# Patient Record
Sex: FEMALE | Race: WHITE | NOT HISPANIC OR LATINO | Employment: FULL TIME | URBAN - NONMETROPOLITAN AREA
[De-identification: names, ages, dates, MRNs, and addresses within clinical notes are randomized per-mention and may not be internally consistent; named-entity substitution may affect disease eponyms.]

---

## 2024-09-04 ENCOUNTER — HOSPITAL ENCOUNTER (OUTPATIENT)
Facility: HOSPITAL | Age: 44
Discharge: HOME | End: 2024-09-05
Attending: STUDENT IN AN ORGANIZED HEALTH CARE EDUCATION/TRAINING PROGRAM | Admitting: STUDENT IN AN ORGANIZED HEALTH CARE EDUCATION/TRAINING PROGRAM
Payer: COMMERCIAL

## 2024-09-04 ENCOUNTER — OFFICE VISIT (OUTPATIENT)
Dept: UROLOGY | Facility: CLINIC | Age: 44
End: 2024-09-04
Payer: COMMERCIAL

## 2024-09-04 VITALS
HEIGHT: 65 IN | OXYGEN SATURATION: 98 % | HEART RATE: 135 BPM | RESPIRATION RATE: 15 BRPM | BODY MASS INDEX: 38.22 KG/M2 | WEIGHT: 229.4 LBS | TEMPERATURE: 97.2 F

## 2024-09-04 DIAGNOSIS — N20.0 NEPHROLITHIASIS: ICD-10-CM

## 2024-09-04 DIAGNOSIS — N20.0 LEFT NEPHROLITHIASIS: Primary | ICD-10-CM

## 2024-09-04 DIAGNOSIS — N20.0 KIDNEY STONE: Primary | ICD-10-CM

## 2024-09-04 DIAGNOSIS — N20.1 LEFT URETERAL STONE: ICD-10-CM

## 2024-09-04 LAB
ANION GAP SERPL CALCULATED.3IONS-SCNC: 14.6 MMOL/L (ref 5–15)
BACTERIA UR QL AUTO: ABNORMAL /HPF
BASOPHILS # BLD AUTO: 0.06 10*3/MM3 (ref 0–0.2)
BASOPHILS NFR BLD AUTO: 0.6 % (ref 0–1.5)
BILIRUB BLD-MCNC: ABNORMAL MG/DL
BILIRUB UR QL STRIP: NEGATIVE
BUN SERPL-MCNC: 14 MG/DL (ref 6–20)
BUN/CREAT SERPL: 15.1 (ref 7–25)
CALCIUM SPEC-SCNC: 9.7 MG/DL (ref 8.6–10.5)
CHLORIDE SERPL-SCNC: 102 MMOL/L (ref 98–107)
CLARITY UR: ABNORMAL
CLARITY, POC: ABNORMAL
CO2 SERPL-SCNC: 24.4 MMOL/L (ref 22–29)
COLOR UR: ABNORMAL
COLOR UR: YELLOW
CREAT SERPL-MCNC: 0.93 MG/DL (ref 0.57–1)
DEPRECATED RDW RBC AUTO: 42.5 FL (ref 37–54)
EGFRCR SERPLBLD CKD-EPI 2021: 77.9 ML/MIN/1.73
EOSINOPHIL # BLD AUTO: 0.34 10*3/MM3 (ref 0–0.4)
EOSINOPHIL NFR BLD AUTO: 3.5 % (ref 0.3–6.2)
ERYTHROCYTE [DISTWIDTH] IN BLOOD BY AUTOMATED COUNT: 13 % (ref 12.3–15.4)
EXPIRATION DATE: ABNORMAL
GLUCOSE SERPL-MCNC: 95 MG/DL (ref 65–99)
GLUCOSE UR STRIP-MCNC: NEGATIVE MG/DL
GLUCOSE UR STRIP-MCNC: NEGATIVE MG/DL
HCT VFR BLD AUTO: 44.9 % (ref 34–46.6)
HGB BLD-MCNC: 15.3 G/DL (ref 12–15.9)
HGB UR QL STRIP.AUTO: ABNORMAL
HYALINE CASTS UR QL AUTO: ABNORMAL /LPF
IMM GRANULOCYTES # BLD AUTO: 0.03 10*3/MM3 (ref 0–0.05)
IMM GRANULOCYTES NFR BLD AUTO: 0.3 % (ref 0–0.5)
KETONES UR QL STRIP: ABNORMAL
KETONES UR QL: NEGATIVE
LEUKOCYTE EST, POC: ABNORMAL
LEUKOCYTE ESTERASE UR QL STRIP.AUTO: ABNORMAL
LYMPHOCYTES # BLD AUTO: 3.24 10*3/MM3 (ref 0.7–3.1)
LYMPHOCYTES NFR BLD AUTO: 33.1 % (ref 19.6–45.3)
Lab: ABNORMAL
MCH RBC QN AUTO: 30.1 PG (ref 26.6–33)
MCHC RBC AUTO-ENTMCNC: 34.1 G/DL (ref 31.5–35.7)
MCV RBC AUTO: 88.4 FL (ref 79–97)
MONOCYTES # BLD AUTO: 0.72 10*3/MM3 (ref 0.1–0.9)
MONOCYTES NFR BLD AUTO: 7.4 % (ref 5–12)
NEUTROPHILS NFR BLD AUTO: 5.39 10*3/MM3 (ref 1.7–7)
NEUTROPHILS NFR BLD AUTO: 55.1 % (ref 42.7–76)
NITRITE UR QL STRIP: NEGATIVE
NITRITE UR-MCNC: NEGATIVE MG/ML
NRBC BLD AUTO-RTO: 0 /100 WBC (ref 0–0.2)
PH UR STRIP.AUTO: 6.5 [PH] (ref 5–8)
PH UR: 6.5 [PH] (ref 5–8)
PLATELET # BLD AUTO: 326 10*3/MM3 (ref 140–450)
PMV BLD AUTO: 10 FL (ref 6–12)
POTASSIUM SERPL-SCNC: 4 MMOL/L (ref 3.5–5.2)
PROT UR QL STRIP: ABNORMAL
PROT UR STRIP-MCNC: ABNORMAL MG/DL
RBC # BLD AUTO: 5.08 10*6/MM3 (ref 3.77–5.28)
RBC # UR STRIP: ABNORMAL /HPF
RBC # UR STRIP: ABNORMAL /UL
REF LAB TEST METHOD: ABNORMAL
SODIUM SERPL-SCNC: 141 MMOL/L (ref 136–145)
SP GR UR STRIP: 1.01 (ref 1–1.03)
SP GR UR: 1.03 (ref 1–1.03)
SQUAMOUS #/AREA URNS HPF: ABNORMAL /HPF
UROBILINOGEN UR QL STRIP: ABNORMAL
UROBILINOGEN UR QL: NORMAL
WBC # UR STRIP: ABNORMAL /HPF
WBC NRBC COR # BLD AUTO: 9.78 10*3/MM3 (ref 3.4–10.8)

## 2024-09-04 PROCEDURE — 25010000002 HYDROMORPHONE 1 MG/ML SOLUTION: Performed by: STUDENT IN AN ORGANIZED HEALTH CARE EDUCATION/TRAINING PROGRAM

## 2024-09-04 PROCEDURE — 87086 URINE CULTURE/COLONY COUNT: CPT | Performed by: STUDENT IN AN ORGANIZED HEALTH CARE EDUCATION/TRAINING PROGRAM

## 2024-09-04 PROCEDURE — 25810000003 SODIUM CHLORIDE 0.9 % SOLUTION: Performed by: STUDENT IN AN ORGANIZED HEALTH CARE EDUCATION/TRAINING PROGRAM

## 2024-09-04 PROCEDURE — G0378 HOSPITAL OBSERVATION PER HR: HCPCS

## 2024-09-04 PROCEDURE — 96361 HYDRATE IV INFUSION ADD-ON: CPT

## 2024-09-04 PROCEDURE — 25010000002 CEFTRIAXONE PER 250 MG: Performed by: STUDENT IN AN ORGANIZED HEALTH CARE EDUCATION/TRAINING PROGRAM

## 2024-09-04 PROCEDURE — 80048 BASIC METABOLIC PNL TOTAL CA: CPT | Performed by: STUDENT IN AN ORGANIZED HEALTH CARE EDUCATION/TRAINING PROGRAM

## 2024-09-04 PROCEDURE — 96375 TX/PRO/DX INJ NEW DRUG ADDON: CPT

## 2024-09-04 PROCEDURE — 25010000002 KETOROLAC TROMETHAMINE PER 15 MG: Performed by: STUDENT IN AN ORGANIZED HEALTH CARE EDUCATION/TRAINING PROGRAM

## 2024-09-04 PROCEDURE — 25010000002 MORPHINE PER 10 MG: Performed by: STUDENT IN AN ORGANIZED HEALTH CARE EDUCATION/TRAINING PROGRAM

## 2024-09-04 PROCEDURE — 96374 THER/PROPH/DIAG INJ IV PUSH: CPT

## 2024-09-04 PROCEDURE — 85025 COMPLETE CBC W/AUTO DIFF WBC: CPT | Performed by: STUDENT IN AN ORGANIZED HEALTH CARE EDUCATION/TRAINING PROGRAM

## 2024-09-04 PROCEDURE — 99285 EMERGENCY DEPT VISIT HI MDM: CPT

## 2024-09-04 PROCEDURE — 25010000002 ONDANSETRON PER 1 MG: Performed by: STUDENT IN AN ORGANIZED HEALTH CARE EDUCATION/TRAINING PROGRAM

## 2024-09-04 PROCEDURE — 81001 URINALYSIS AUTO W/SCOPE: CPT | Performed by: STUDENT IN AN ORGANIZED HEALTH CARE EDUCATION/TRAINING PROGRAM

## 2024-09-04 PROCEDURE — 99223 1ST HOSP IP/OBS HIGH 75: CPT | Performed by: STUDENT IN AN ORGANIZED HEALTH CARE EDUCATION/TRAINING PROGRAM

## 2024-09-04 RX ORDER — KETOROLAC TROMETHAMINE 30 MG/ML
15 INJECTION, SOLUTION INTRAMUSCULAR; INTRAVENOUS ONCE
Status: COMPLETED | OUTPATIENT
Start: 2024-09-04 | End: 2024-09-04

## 2024-09-04 RX ORDER — SODIUM CHLORIDE 9 MG/ML
100 INJECTION, SOLUTION INTRAVENOUS CONTINUOUS
Status: CANCELLED | OUTPATIENT
Start: 2024-09-04

## 2024-09-04 RX ORDER — SODIUM CHLORIDE 9 MG/ML
40 INJECTION, SOLUTION INTRAVENOUS AS NEEDED
Status: DISCONTINUED | OUTPATIENT
Start: 2024-09-04 | End: 2024-09-05 | Stop reason: HOSPADM

## 2024-09-04 RX ORDER — ACETAMINOPHEN 325 MG/1
975 TABLET ORAL ONCE
Status: CANCELLED | OUTPATIENT
Start: 2024-09-04 | End: 2024-09-04

## 2024-09-04 RX ORDER — SODIUM CHLORIDE 0.9 % (FLUSH) 0.9 %
10 SYRINGE (ML) INJECTION AS NEEDED
Status: DISCONTINUED | OUTPATIENT
Start: 2024-09-04 | End: 2024-09-05 | Stop reason: HOSPADM

## 2024-09-04 RX ORDER — GENTAMICIN 40 MG/ML
80 INJECTION, SOLUTION INTRAMUSCULAR; INTRAVENOUS ONCE
Status: CANCELLED | OUTPATIENT
Start: 2024-09-05

## 2024-09-04 RX ORDER — CEFDINIR 300 MG/1
300 CAPSULE ORAL
COMMUNITY
Start: 2024-08-25 | End: 2024-09-05 | Stop reason: HOSPADM

## 2024-09-04 RX ORDER — SODIUM CHLORIDE 9 MG/ML
100 INJECTION, SOLUTION INTRAVENOUS CONTINUOUS
Status: DISCONTINUED | OUTPATIENT
Start: 2024-09-04 | End: 2024-09-05

## 2024-09-04 RX ORDER — NITROGLYCERIN 0.4 MG/1
0.4 TABLET SUBLINGUAL
Status: DISCONTINUED | OUTPATIENT
Start: 2024-09-04 | End: 2024-09-05 | Stop reason: HOSPADM

## 2024-09-04 RX ORDER — SODIUM CHLORIDE 0.9 % (FLUSH) 0.9 %
10 SYRINGE (ML) INJECTION EVERY 12 HOURS SCHEDULED
Status: DISCONTINUED | OUTPATIENT
Start: 2024-09-04 | End: 2024-09-05 | Stop reason: HOSPADM

## 2024-09-04 RX ORDER — METHENAMINE, BENZOIC ACID, PHENYL SALICYLATE, METHYLENE BLUE, AND HYOSCYAMINE SULFATE 9; .12; 81.6; 10.8; 36.2 MG/1; MG/1; MG/1; MG/1; MG/1
1 TABLET, COATED ORAL 3 TIMES DAILY PRN
COMMUNITY
Start: 2024-08-26

## 2024-09-04 RX ORDER — ONDANSETRON 2 MG/ML
4 INJECTION INTRAMUSCULAR; INTRAVENOUS EVERY 6 HOURS PRN
Status: DISCONTINUED | OUTPATIENT
Start: 2024-09-04 | End: 2024-09-05 | Stop reason: HOSPADM

## 2024-09-04 RX ORDER — KETOROLAC TROMETHAMINE 30 MG/ML
30 INJECTION, SOLUTION INTRAMUSCULAR; INTRAVENOUS EVERY 6 HOURS PRN
Status: DISCONTINUED | OUTPATIENT
Start: 2024-09-04 | End: 2024-09-05 | Stop reason: HOSPADM

## 2024-09-04 RX ORDER — HYDROCODONE BITARTRATE AND ACETAMINOPHEN 5; 325 MG/1; MG/1
1 TABLET ORAL EVERY 6 HOURS PRN
Status: DISCONTINUED | OUTPATIENT
Start: 2024-09-04 | End: 2024-09-05 | Stop reason: HOSPADM

## 2024-09-04 RX ORDER — ONDANSETRON 2 MG/ML
4 INJECTION INTRAMUSCULAR; INTRAVENOUS ONCE
Status: COMPLETED | OUTPATIENT
Start: 2024-09-04 | End: 2024-09-04

## 2024-09-04 RX ORDER — CYCLOBENZAPRINE HCL 10 MG
1 TABLET ORAL 3 TIMES DAILY
COMMUNITY
Start: 2024-08-29

## 2024-09-04 RX ADMIN — SODIUM CHLORIDE 2000 MG: 9 INJECTION, SOLUTION INTRAVENOUS at 22:47

## 2024-09-04 RX ADMIN — HYDROMORPHONE HYDROCHLORIDE 1 MG: 1 INJECTION, SOLUTION INTRAMUSCULAR; INTRAVENOUS; SUBCUTANEOUS at 20:13

## 2024-09-04 RX ADMIN — ONDANSETRON 4 MG: 2 INJECTION INTRAMUSCULAR; INTRAVENOUS at 16:23

## 2024-09-04 RX ADMIN — Medication 10 ML: at 22:47

## 2024-09-04 RX ADMIN — HYDROCODONE BITARTRATE AND ACETAMINOPHEN 1 TABLET: 5; 325 TABLET ORAL at 22:46

## 2024-09-04 RX ADMIN — SODIUM CHLORIDE 100 ML/HR: 9 INJECTION, SOLUTION INTRAVENOUS at 22:47

## 2024-09-04 RX ADMIN — MORPHINE SULFATE 4 MG: 4 INJECTION, SOLUTION INTRAMUSCULAR; INTRAVENOUS at 17:31

## 2024-09-04 RX ADMIN — KETOROLAC TROMETHAMINE 15 MG: 30 INJECTION, SOLUTION INTRAMUSCULAR; INTRAVENOUS at 16:23

## 2024-09-04 RX ADMIN — SODIUM CHLORIDE 1000 ML: 9 INJECTION, SOLUTION INTRAVENOUS at 16:23

## 2024-09-04 NOTE — PROGRESS NOTES
Office Visit [facility]     Patient Name: Mary Kate Louise  : 1980   MRN: [id number]     Chief Complaint: Kidney Stones.    Chief Complaint   Patient presents with    kidney stone    Establish Care       Referring Provider: No ref. provider found    History of Present Illness: Mary Kate Louise is a 44 y.o. female who presents today for further management of nephrolithiasis.  Ms. Louise was admitted on  at [facility] and a CT scan was performed which revealed a 7 mm proximal left ureteral stone with moderate hydronephrosis.  Cystoscopy with stent placement was performed and she was discharged home.      Today she is tearful and is unable to find a comfortable position sitting or standing.  Rates pain 7/10 and abdominal pain.  No fever, chills, sweats.  Has been taking Cefinir twice daily until today.  Has not taken any medications today.  She has not eaten or drank since midnight.      Stone prevention medications: none    Stone related history  Family history of stones:   no  Renal disease or anatomic abnormality: no  Malabsorptive disease or gastric bypass: no  Frequent UTI's    no  Parathyroid disease    no    Dietary Considerations  Soda - 1-2 per day  Fast food - 0 per week  Water - 64 ounces per day  Subjective      Review of System: ROS reviewed by me and is negative unless otherwise noted in HPI.      Past Medical History:   Past Medical History:   Diagnosis Date    Kidney stone     Have a stent placed 24 and still have stone 7 mm     Past Surgical History:   Past Surgical History:   Procedure Laterality Date     SECTION  03    HYSTERECTOMY  2010    KIDNEY STONE SURGERY  24    Still have stent and stone 7mm    TONSILLECTOMY N/A      Family History: History reviewed. No pertinent family history.    Social History:   Social History     Socioeconomic History    Marital status:    Tobacco Use    Smoking status: Never     Passive exposure: Never    Smokeless  "tobacco: Never   Vaping Use    Vaping status: Never Used   Substance and Sexual Activity    Alcohol use: Never    Drug use: Never    Sexual activity: Yes     Partners: Male     Birth control/protection: Hysterectomy     Medications:     Current Outpatient Medications:     cefdinir (OMNICEF) 300 MG capsule, Take 1 capsule by mouth. (Patient not taking: Reported on 9/4/2024), Disp: , Rfl:     cyclobenzaprine (FLEXERIL) 10 MG tablet, Take 1 tablet by mouth 3 times a day. (Patient not taking: Reported on 9/4/2024), Disp: , Rfl:     Uribel 81.6 MG tablet tablet, Take 1 tablet by mouth 3 (Three) Times a Day As Needed. for pain (Patient not taking: Reported on 9/4/2024), Disp: , Rfl:     Allergies:   Allergies   Allergen Reactions    Amoxicillin Hives    Amoxicillin-Pot Clavulanate Hives    Levofloxacin Hives       Objective     Physical Exam:   Vital Signs:   Vitals:    09/04/24 1341   Pulse: (!) 135   Resp: 15   Temp: 97.2 °F (36.2 °C)   TempSrc: Temporal   SpO2: 98%   Weight: 104 kg (229 lb 6.4 oz)   Height: 166.2 cm (65.43\")   PainSc:   7   PainLoc: Abdomen  Comment: right flank radiating to right lower abd     Body mass index is 37.67 kg/m².   Physical Exam  Vitals and nursing note reviewed.   Constitutional:       General: She is in acute distress (she is grimacing and very tearful with position changes.).      Appearance: Normal appearance. She is obese. She is not ill-appearing.   HENT:      Head: Normocephalic and atraumatic.   Pulmonary:      Effort: Pulmonary effort is normal.   Skin:     General: Skin is warm and dry.   Neurological:      General: No focal deficit present.      Mental Status: She is alert and oriented to person, place, and time.   Psychiatric:         Mood and Affect: Affect is tearful.         Behavior: Behavior normal.      Labs    Brief Urine Lab Results  (Last result in the past 365 days)        Color   Clarity   Blood   Leuk Est   Nitrite   Protein   CREAT   Urine HCG        09/04/24 1518 " Green   Slightly Cloudy   Large   Small (1+)   Negative   300 mg/dL                 Radiologic Studies  CT Abdomen Pelvis Without Contrast  Result Date: 8/24/2024  IMPRESSION: 1. Obstructive uropathy secondary to 7 mm calculus at the ureteral pelvic junction left kidney. 2. Right nephrolithiasis     I have personally reviewed these labs and images.    Assessment / Plan      Assessment  Ms. Louise is a 44 y.o. female with 6.4 left ureteral stone with stent.    We had informed discussion about the causes of stones, in the main treatments for nephrolithiasis.  The 3 main surgical treatments for stones are percutaneous nephrolithotomy, ureteroscopy and laser lithotripsy, and shockwave lithotripsy. Based on patient factors and the stone size and location, I recommend left URS LL with stent exchange.    The patient is discussed with Dr. Marie who independently reviews and interprets associated imaging and agrees with plan.  Plan to direct admit Ms. Louise with hospitalist group for pain management and perform surgery tomorrow.  Discussed direct admit with Dr. Urena who informed me that there are no beds available.  I have asked Ms. Louise to go to the ER and have given report to Dr. Barker.  Patient was agreeable to this plan and will go directly to the ER. Declined a wheelchair.      We discussed the risks, benefits, and alternatives to this therapy.  The main risks that we discussed were bleeding, urinary infection, damage to nearby structures, need for further procedures, and cardiopulmonary complications from anesthesia.  The patient voiced understanding and wished to proceed.     Diagnoses and all orders for this visit:    1. Kidney stone (Primary)  -     POC Urinalysis Dipstick, Automated  -     XR abdomen kub    2. Left ureteral stone  -     Case Request; Standing  -     Follow Anesthesia Guidelines / Protocol; Future  -     Obtain Informed Consent; Future  -     Provide NPO Instructions to Patient; Future  -      Chlorhexidine Skin Prep; Future  -     sodium chloride 0.9 % infusion  -     acetaminophen (TYLENOL) tablet 975 mg  -     clindamycin (CLEOCIN) 900 mg in dextrose (D5W) 5 % 100 mL IVPB  -     gentamicin (GARAMYCIN) injection 80 mg  -     Case Request  -     XR abdomen kub    Other orders  -     Follow Anesthesia Guidelines / Protocol; Standing  -     Obtain Informed Consent; Standing  -     Verify / Perform Chlorhexidine Skin Prep; Standing  -     Place Sequential Compression Device; Standing  -     Maintain Sequential Compression Device; Standing  -     POC Urine Pregnancy; Standing    Follow Up:   Return for Proceed to the ER today.  Attempted to direct admit.  .    I spent a total of 70 minutes with the patient and the chart engaging in data gathering and interpretation, patient interaction, as well as counseling on the risks, benefits, and alternatives of the therapy and coordinating care.      Ashanti Day, APRN, MSN, FNP-C  [facility]

## 2024-09-04 NOTE — ED PROVIDER NOTES
[facility]  Emergency Department Encounter  Emergency Medicine Physician Note       Pt Name: Mary Kate Louise  MRN: [id number]  Pt :   1980  Room Number:  W209/1  Date of encounter:  2024  PCP: Viry Munson APRN  ED Physician: Kasi Barker DO    HPI:  Mary Kate Louise is a 44 y.o. female who presents to the ED with chief complaint of kidney stone.  Patient reports she developed left-sided flank pain on .  She presented to outside hospital.  Addressed with submillimeter kidney stone.  She followed up with urology today.  Had continued symptoms.  Unable to direct admit the patient, therefore patient instructed to come to the ED to facilitate admission.  Plan for surgical intervention tomorrow.  No fever, chills, nausea vomiting diarrhea, dysuria.    PAST MEDICAL HISTORY  Past Medical History:   Diagnosis Date    Kidney stone     Have a stent placed 24 and still have stone 7 mm     Current Outpatient Medications   Medication Instructions    acetaminophen (TYLENOL) 1,000 mg, Oral, Every 6 Hours    cefdinir (OMNICEF) 300 mg, Oral    cyclobenzaprine (FLEXERIL) 10 MG tablet 1 tablet, Oral, 3 times daily    diclofenac (VOLTAREN) 50 mg, Oral, 2 Times Daily    docusate sodium (COLACE) 100 mg, Oral, 2 Times Daily, If taking oxycodone    oxybutynin XL (DITROPAN XL) 10 mg, Oral, Daily PRN    oxyCODONE (ROXICODONE) 5 mg, Oral, Every 6 Hours PRN    tamsulosin (FLOMAX) 0.4 mg, Oral, Nightly    Uribel 81.6 MG tablet tablet 1 tablet, Oral, 3 Times Daily PRN, for pain      PAST SURGICAL HISTORY  Past Surgical History:   Procedure Laterality Date     SECTION  03    HYSTERECTOMY  2010    KIDNEY STONE SURGERY  24    Still have stent and stone 7mm    TONSILLECTOMY N/A        FAMILY HISTORY  History reviewed. No pertinent family history.    SOCIAL HISTORY  Social History     Socioeconomic History    Marital status:    Tobacco Use    Smoking status: Never      Passive exposure: Never    Smokeless tobacco: Never   Vaping Use    Vaping status: Never Used   Substance and Sexual Activity    Alcohol use: Never    Drug use: Never    Sexual activity: Yes     Partners: Male     Birth control/protection: Hysterectomy     ALLERGIES  Amoxicillin, Amoxicillin-pot clavulanate, and Levofloxacin    REVIEW OF SYSTEMS  All systems reviewed and negative except for those discussed in HPI.     PHYSICAL EXAM  ED Triage Vitals [09/04/24 1534]   Temp Heart Rate Resp BP SpO2   98.2 °F (36.8 °C) (!) 123 20 144/90 100 %      Temp src Heart Rate Source Patient Position BP Location FiO2 (%)   Oral Monitor Sitting Left arm --     I have reviewed the triage vital signs and nursing notes.    General: Alert.  Nontoxic appearance.  Appears uncomfortable secondary to symptoms, but in no acute distress.  Head: Normocephalic.  Atraumatic.  Eyes: No scleral icterus.  ENT: Moist mucous membranes.  Cardiovascular: Regular rate and rhythm.  No murmurs.  No rubs.  2+ distal pulses bilaterally.  Respiratory: Equal breath sounds bilaterally.  No rales.  No rhonchi.  No wheezing.  GI: Abdomen is soft.  Nondistended.  Nontender to palpation.  No rebound.  No guarding.  No CVA tenderness.  MSK: Moves all 4 extremities.  Neurologic: Oriented x 3.  No focal deficits.  Skin: No erythema.  No edema. No pallor. No cyanosis.  Psych: Normal mood and affect.    LAB RESULTS  Recent Results (from the past 24 hour(s))   POC Urinalysis Dipstick, Automated    Collection Time: 09/04/24  3:18 PM    Specimen: Urine   Result Value Ref Range    Color Green (A) Yellow, Straw, Dark Yellow, Freya    Clarity, UA Slightly Cloudy (A) Clear    Specific Gravity  1.030 1.005 - 1.030    pH, Urine 6.5 5.0 - 8.0    Leukocytes Small (1+) (A) Negative    Nitrite, UA Negative Negative    Protein,  mg/dL (A) Negative mg/dL    Glucose, UA Negative Negative mg/dL    Ketones, UA Negative Negative    Urobilinogen, UA Normal Normal, 0.2 E.U./dL     Bilirubin Small (1+) (A) Negative    Blood, UA Large (A) Negative    Lot Number [id number]     Expiration Date 05/31/2025    Basic Metabolic Panel    Collection Time: 09/04/24  4:22 PM    Specimen: Blood   Result Value Ref Range    Glucose 95 65 - 99 mg/dL    BUN 14 6 - 20 mg/dL    Creatinine 0.93 0.57 - 1.00 mg/dL    Sodium 141 136 - 145 mmol/L    Potassium 4.0 3.5 - 5.2 mmol/L    Chloride 102 98 - 107 mmol/L    CO2 24.4 22.0 - 29.0 mmol/L    Calcium 9.7 8.6 - 10.5 mg/dL    BUN/Creatinine Ratio 15.1 7.0 - 25.0    Anion Gap 14.6 5.0 - 15.0 mmol/L    eGFR 77.9 >60.0 mL/min/1.73   CBC Auto Differential    Collection Time: 09/04/24  4:22 PM    Specimen: Blood   Result Value Ref Range    WBC 9.78 3.40 - 10.80 10*3/mm3    RBC 5.08 3.77 - 5.28 10*6/mm3    Hemoglobin 15.3 12.0 - 15.9 g/dL    Hematocrit 44.9 34.0 - 46.6 %    MCV 88.4 79.0 - 97.0 fL    MCH 30.1 26.6 - 33.0 pg    MCHC 34.1 31.5 - 35.7 g/dL    RDW 13.0 12.3 - 15.4 %    RDW-SD 42.5 37.0 - 54.0 fl    MPV 10.0 6.0 - 12.0 fL    Platelets 326 140 - 450 10*3/mm3    Neutrophil % 55.1 42.7 - 76.0 %    Lymphocyte % 33.1 19.6 - 45.3 %    Monocyte % 7.4 5.0 - 12.0 %    Eosinophil % 3.5 0.3 - 6.2 %    Basophil % 0.6 0.0 - 1.5 %    Immature Grans % 0.3 0.0 - 0.5 %    Neutrophils, Absolute 5.39 1.70 - 7.00 10*3/mm3    Lymphocytes, Absolute 3.24 (H) 0.70 - 3.10 10*3/mm3    Monocytes, Absolute 0.72 0.10 - 0.90 10*3/mm3    Eosinophils, Absolute 0.34 0.00 - 0.40 10*3/mm3    Basophils, Absolute 0.06 0.00 - 0.20 10*3/mm3    Immature Grans, Absolute 0.03 0.00 - 0.05 10*3/mm3    nRBC 0.0 0.0 - 0.2 /100 WBC   Urinalysis With Microscopic If Indicated (No Culture) - Urine, Clean Catch    Collection Time: 09/04/24  6:13 PM    Specimen: Urine, Clean Catch   Result Value Ref Range    Color, UA Yellow Yellow, Straw    Appearance, UA Cloudy (A) Clear    pH, UA 6.5 5.0 - 8.0    Specific Gravity, UA 1.015 1.005 - 1.030    Glucose, UA Negative Negative    Ketones, UA 15 mg/dL (1+) (A) Negative     Bilirubin, UA Negative Negative    Blood, UA Large (3+) (A) Negative    Protein, UA >=300 mg/dL (3+) (A) Negative    Leuk Esterase, UA Small (1+) (A) Negative    Nitrite, UA Negative Negative    Urobilinogen, UA 0.2 E.U./dL 0.2 - 1.0 E.U./dL   Urinalysis, Microscopic Only - Urine, Clean Catch    Collection Time: 09/04/24  6:13 PM    Specimen: Urine, Clean Catch   Result Value Ref Range    RBC, UA Too Numerous to Count (A) None Seen, 0-2 /HPF    WBC, UA Unable to determine due to loaded field (A) None Seen, 0-2 /HPF    Bacteria, UA Trace (A) None Seen /HPF    Squamous Epithelial Cells, UA 3-6 (A) None Seen, 0-2 /HPF    Hyaline Casts, UA None Seen None Seen /LPF    Methodology Manual Light Microscopy    Basic Metabolic Panel    Collection Time: 09/05/24  6:46 AM    Specimen: Blood   Result Value Ref Range    Glucose 96 65 - 99 mg/dL    BUN 16 6 - 20 mg/dL    Creatinine 0.81 0.57 - 1.00 mg/dL    Sodium 137 136 - 145 mmol/L    Potassium 4.1 3.5 - 5.2 mmol/L    Chloride 105 98 - 107 mmol/L    CO2 21.9 (L) 22.0 - 29.0 mmol/L    Calcium 8.5 (L) 8.6 - 10.5 mg/dL    BUN/Creatinine Ratio 19.8 7.0 - 25.0    Anion Gap 10.1 5.0 - 15.0 mmol/L    eGFR 91.9 >60.0 mL/min/1.73   CBC Auto Differential    Collection Time: 09/05/24  6:46 AM    Specimen: Blood   Result Value Ref Range    WBC 7.99 3.40 - 10.80 10*3/mm3    RBC 4.21 3.77 - 5.28 10*6/mm3    Hemoglobin 12.8 12.0 - 15.9 g/dL    Hematocrit 38.5 34.0 - 46.6 %    MCV 91.4 79.0 - 97.0 fL    MCH 30.4 26.6 - 33.0 pg    MCHC 33.2 31.5 - 35.7 g/dL    RDW 13.2 12.3 - 15.4 %    RDW-SD 44.4 37.0 - 54.0 fl    MPV 10.4 6.0 - 12.0 fL    Platelets 272 140 - 450 10*3/mm3    Neutrophil % 40.0 (L) 42.7 - 76.0 %    Lymphocyte % 43.7 19.6 - 45.3 %    Monocyte % 10.1 5.0 - 12.0 %    Eosinophil % 5.3 0.3 - 6.2 %    Basophil % 0.8 0.0 - 1.5 %    Immature Grans % 0.1 0.0 - 0.5 %    Neutrophils, Absolute 3.20 1.70 - 7.00 10*3/mm3    Lymphocytes, Absolute 3.49 (H) 0.70 - 3.10 10*3/mm3    Monocytes,  Absolute 0.81 0.10 - 0.90 10*3/mm3    Eosinophils, Absolute 0.42 (H) 0.00 - 0.40 10*3/mm3    Basophils, Absolute 0.06 0.00 - 0.20 10*3/mm3    Immature Grans, Absolute 0.01 0.00 - 0.05 10*3/mm3    nRBC 0.0 0.0 - 0.2 /100 WBC       RADIOLOGY  No Radiology Exams Resulted Within Past 24 Hours    PROCEDURES  Procedures    RISK STRATIFICATION    MEDICAL DECISION MAKING  44 y.o. female with past medical history listed above who presents with left flank pain with known obstructive kidney stone.    Vital signs in triage remarkable tachycardia, otherwise within normal limits.    External notes reviewed.  Radiology report from 8/24/2024 reviewed.  CT abdomen pelvis without contrast.  Obstructive uropathy secondary to similar calculus at the ureteropelvic junction.    At least 3 different tests have been ordered on this patient.    No clinical indication for repeat imaging.    Please see ED course below for my interpretation of the ED workup.  ED Course as of 09/05/24 1107   Wed Sep 04, 2024   1706 I reviewed the labs listed above. Clinically unremarkable.    Notable findings are highlighted below.    Old laboratory data was reviewed from the medical records and compared to today's results.   [JS]   1707 CBC & Differential(!) [JS]   1707 Basic Metabolic Panel [JS]   1713 I have discussed the indication, risk, and alternatives of the following high risk medications: IV morphine   [JS]   1856 Urinalysis With Microscopic If Indicated (No Culture) - Urine, Clean Catch(!) [JS]   1856 Blood, UA(!): Large (3+) [JS]   1856 Leukocytes, UA(!): Small (1+) [JS]      ED Course User Index  [JS] Kasi Barker DO     Medications administered in ED:  Medications   sodium chloride 0.9 % flush 10 mL (has no administration in time range)   sodium chloride 0.9 % flush 10 mL (10 mL Intravenous Not Given 9/5/24 0819)   sodium chloride 0.9 % flush 10 mL (has no administration in time range)   sodium chloride 0.9 % infusion 40 mL (has no  administration in time range)   ondansetron (ZOFRAN) injection 4 mg (4 mg Intravenous Given 9/5/24 0218)   nitroglycerin (NITROSTAT) SL tablet 0.4 mg (has no administration in time range)   Potassium Replacement - Follow Nurse / BPA Driven Protocol (has no administration in time range)   Magnesium Standard Dose Replacement - Follow Nurse / BPA Driven Protocol (has no administration in time range)   Phosphorus Replacement - Follow Nurse / BPA Driven Protocol (has no administration in time range)   Calcium Replacement - Follow Nurse / BPA Driven Protocol (has no administration in time range)   sodium chloride 0.9 % infusion (100 mL/hr Intravenous New Bag 9/5/24 0826)   ketorolac (TORADOL) injection 30 mg (30 mg Intravenous Given 9/5/24 0105)   HYDROcodone-acetaminophen (NORCO) 5-325 MG per tablet 1 tablet (1 tablet Oral Given 9/5/24 0507)   cefTRIAXone (ROCEPHIN) 2,000 mg in sodium chloride 0.9 % 100 mL IVPB-VTB (2,000 mg Intravenous New Bag 9/4/24 2247)   morphine injection 2 mg (2 mg Intravenous Given 9/5/24 0826)   gentamicin (GARAMYCIN) injection 80 mg (has no administration in time range)   sodium chloride 0.9 % infusion (100 mL/hr Intravenous Currently Infusing 9/5/24 0944)   acetaminophen (TYLENOL) tablet 975 mg (has no administration in time range)   clindamycin (CLEOCIN) 900 mg in dextrose 5% 50 mL IVPB (premix) (has no administration in time range)   ondansetron (ZOFRAN) injection 4 mg (4 mg Intravenous Given 9/4/24 1623)   ketorolac (TORADOL) injection 15 mg (15 mg Intravenous Given 9/4/24 1623)   sodium chloride 0.9 % bolus 1,000 mL (0 mL Intravenous Stopped 9/4/24 1809)   morphine injection 4 mg (4 mg Intravenous Given 9/4/24 1731)   HYDROmorphone (DILAUDID) injection 1 mg (1 mg Intravenous Given 9/4/24 2013)     Urology already has OR case booked for tomorrow. Plan for medical admission for further workup and management.  I discussed the findings of the ED workup with the patient including my  recommendation for admission.  Patient agreeable with plan and disposition.    Case discussed with Dr. Urena (hospitalist) who agrees to evaluate and admit the patient.  We discussed the HPI, pertinent PMHx, ED course and workup.    Chronic conditions affecting care: None    Social determinants of health impacting treatment or disposition: None    REPEAT VITAL SIGNS  AS OF 11:07 EDT VITALS:  BP - 139/80  HR - 91  TEMP - 97.9 °F (36.6 °C) (Oral)  O2 SATS - 99%    DIAGNOSIS  Final diagnoses:   Left nephrolithiasis     DISPOSITION  ED Disposition       ED Disposition   Decision to Admit    Condition   --    Comment   Level of Care: Telemetry [5]   Diagnosis: Ureteral stone [[id number]]                 Please note that portions of this document were completed with voice recognition software.        Kasi Barker DO  09/05/24 1105

## 2024-09-05 ENCOUNTER — APPOINTMENT (OUTPATIENT)
Dept: CT IMAGING | Facility: HOSPITAL | Age: 44
End: 2024-09-05
Payer: COMMERCIAL

## 2024-09-05 ENCOUNTER — ANESTHESIA EVENT (OUTPATIENT)
Dept: PERIOP | Facility: HOSPITAL | Age: 44
End: 2024-09-05
Payer: COMMERCIAL

## 2024-09-05 ENCOUNTER — ANESTHESIA (OUTPATIENT)
Dept: PERIOP | Facility: HOSPITAL | Age: 44
End: 2024-09-05
Payer: COMMERCIAL

## 2024-09-05 VITALS
HEIGHT: 66 IN | OXYGEN SATURATION: 96 % | WEIGHT: 233.69 LBS | RESPIRATION RATE: 16 BRPM | HEART RATE: 99 BPM | SYSTOLIC BLOOD PRESSURE: 136 MMHG | DIASTOLIC BLOOD PRESSURE: 79 MMHG | TEMPERATURE: 98.2 F | BODY MASS INDEX: 37.56 KG/M2

## 2024-09-05 LAB
ANION GAP SERPL CALCULATED.3IONS-SCNC: 10.1 MMOL/L (ref 5–15)
BASOPHILS # BLD AUTO: 0.06 10*3/MM3 (ref 0–0.2)
BASOPHILS NFR BLD AUTO: 0.8 % (ref 0–1.5)
BUN SERPL-MCNC: 16 MG/DL (ref 6–20)
BUN/CREAT SERPL: 19.8 (ref 7–25)
CALCIUM SPEC-SCNC: 8.5 MG/DL (ref 8.6–10.5)
CHLORIDE SERPL-SCNC: 105 MMOL/L (ref 98–107)
CO2 SERPL-SCNC: 21.9 MMOL/L (ref 22–29)
CREAT SERPL-MCNC: 0.81 MG/DL (ref 0.57–1)
DEPRECATED RDW RBC AUTO: 44.4 FL (ref 37–54)
EGFRCR SERPLBLD CKD-EPI 2021: 91.9 ML/MIN/1.73
EOSINOPHIL # BLD AUTO: 0.42 10*3/MM3 (ref 0–0.4)
EOSINOPHIL NFR BLD AUTO: 5.3 % (ref 0.3–6.2)
ERYTHROCYTE [DISTWIDTH] IN BLOOD BY AUTOMATED COUNT: 13.2 % (ref 12.3–15.4)
GLUCOSE SERPL-MCNC: 96 MG/DL (ref 65–99)
HCT VFR BLD AUTO: 38.5 % (ref 34–46.6)
HGB BLD-MCNC: 12.8 G/DL (ref 12–15.9)
IMM GRANULOCYTES # BLD AUTO: 0.01 10*3/MM3 (ref 0–0.05)
IMM GRANULOCYTES NFR BLD AUTO: 0.1 % (ref 0–0.5)
LYMPHOCYTES # BLD AUTO: 3.49 10*3/MM3 (ref 0.7–3.1)
LYMPHOCYTES NFR BLD AUTO: 43.7 % (ref 19.6–45.3)
MCH RBC QN AUTO: 30.4 PG (ref 26.6–33)
MCHC RBC AUTO-ENTMCNC: 33.2 G/DL (ref 31.5–35.7)
MCV RBC AUTO: 91.4 FL (ref 79–97)
MONOCYTES # BLD AUTO: 0.81 10*3/MM3 (ref 0.1–0.9)
MONOCYTES NFR BLD AUTO: 10.1 % (ref 5–12)
NEUTROPHILS NFR BLD AUTO: 3.2 10*3/MM3 (ref 1.7–7)
NEUTROPHILS NFR BLD AUTO: 40 % (ref 42.7–76)
NRBC BLD AUTO-RTO: 0 /100 WBC (ref 0–0.2)
PLATELET # BLD AUTO: 272 10*3/MM3 (ref 140–450)
PMV BLD AUTO: 10.4 FL (ref 6–12)
POTASSIUM SERPL-SCNC: 4.1 MMOL/L (ref 3.5–5.2)
RBC # BLD AUTO: 4.21 10*6/MM3 (ref 3.77–5.28)
SODIUM SERPL-SCNC: 137 MMOL/L (ref 136–145)
WBC NRBC COR # BLD AUTO: 7.99 10*3/MM3 (ref 3.4–10.8)

## 2024-09-05 PROCEDURE — G0378 HOSPITAL OBSERVATION PER HR: HCPCS

## 2024-09-05 PROCEDURE — 25010000002 MORPHINE PER 10 MG: Performed by: NURSE PRACTITIONER

## 2024-09-05 PROCEDURE — 96376 TX/PRO/DX INJ SAME DRUG ADON: CPT

## 2024-09-05 PROCEDURE — 25010000002 KETOROLAC TROMETHAMINE PER 15 MG: Performed by: NURSE ANESTHETIST, CERTIFIED REGISTERED

## 2024-09-05 PROCEDURE — 80048 BASIC METABOLIC PNL TOTAL CA: CPT | Performed by: UROLOGY

## 2024-09-05 PROCEDURE — 25810000003 SODIUM CHLORIDE 0.9 % SOLUTION: Performed by: UROLOGY

## 2024-09-05 PROCEDURE — 99222 1ST HOSP IP/OBS MODERATE 55: CPT | Performed by: UROLOGY

## 2024-09-05 PROCEDURE — 25010000002 CLINDAMYCIN 900 MG/50ML SOLUTION: Performed by: NURSE PRACTITIONER

## 2024-09-05 PROCEDURE — C1758 CATHETER, URETERAL: HCPCS | Performed by: UROLOGY

## 2024-09-05 PROCEDURE — C1769 GUIDE WIRE: HCPCS | Performed by: UROLOGY

## 2024-09-05 PROCEDURE — 25810000003 SODIUM CHLORIDE 0.9 % SOLUTION: Performed by: STUDENT IN AN ORGANIZED HEALTH CARE EDUCATION/TRAINING PROGRAM

## 2024-09-05 PROCEDURE — 96361 HYDRATE IV INFUSION ADD-ON: CPT

## 2024-09-05 PROCEDURE — C2617 STENT, NON-COR, TEM W/O DEL: HCPCS | Performed by: UROLOGY

## 2024-09-05 PROCEDURE — 74420 UROGRAPHY RTRGR +-KUB: CPT | Performed by: UROLOGY

## 2024-09-05 PROCEDURE — 25010000002 GENTAMICIN PER 80 MG: Performed by: NURSE ANESTHETIST, CERTIFIED REGISTERED

## 2024-09-05 PROCEDURE — C1894 INTRO/SHEATH, NON-LASER: HCPCS | Performed by: UROLOGY

## 2024-09-05 PROCEDURE — 25010000002 ONDANSETRON PER 1 MG: Performed by: STUDENT IN AN ORGANIZED HEALTH CARE EDUCATION/TRAINING PROGRAM

## 2024-09-05 PROCEDURE — 74176 CT ABD & PELVIS W/O CONTRAST: CPT

## 2024-09-05 PROCEDURE — 25010000002 FENTANYL CITRATE (PF) 50 MCG/ML SOLUTION PREFILLED SYRINGE: Performed by: NURSE ANESTHETIST, CERTIFIED REGISTERED

## 2024-09-05 PROCEDURE — 82365 CALCULUS SPECTROSCOPY: CPT | Performed by: UROLOGY

## 2024-09-05 PROCEDURE — 25010000002 ONDANSETRON PER 1 MG: Performed by: NURSE ANESTHETIST, CERTIFIED REGISTERED

## 2024-09-05 PROCEDURE — 25810000003 SODIUM CHLORIDE 0.9 % SOLUTION: Performed by: NURSE PRACTITIONER

## 2024-09-05 PROCEDURE — 99239 HOSP IP/OBS DSCHRG MGMT >30: CPT | Performed by: NURSE PRACTITIONER

## 2024-09-05 PROCEDURE — 25010000002 HYDROMORPHONE 1 MG/ML SOLUTION: Performed by: NURSE ANESTHETIST, CERTIFIED REGISTERED

## 2024-09-05 PROCEDURE — 25510000001 IOPAMIDOL 61 % SOLUTION: Performed by: UROLOGY

## 2024-09-05 PROCEDURE — S0260 H&P FOR SURGERY: HCPCS | Performed by: UROLOGY

## 2024-09-05 PROCEDURE — 25010000002 DEXAMETHASONE PER 1 MG: Performed by: NURSE ANESTHETIST, CERTIFIED REGISTERED

## 2024-09-05 PROCEDURE — 25010000002 MIDAZOLAM PER 1MG: Performed by: NURSE ANESTHETIST, CERTIFIED REGISTERED

## 2024-09-05 PROCEDURE — 85025 COMPLETE CBC W/AUTO DIFF WBC: CPT | Performed by: UROLOGY

## 2024-09-05 PROCEDURE — 25010000002 PROPOFOL 10 MG/ML EMULSION: Performed by: NURSE ANESTHETIST, CERTIFIED REGISTERED

## 2024-09-05 PROCEDURE — 25010000002 GENTAMICIN PER 80 MG: Performed by: UROLOGY

## 2024-09-05 PROCEDURE — 25010000002 KETOROLAC TROMETHAMINE PER 15 MG: Performed by: STUDENT IN AN ORGANIZED HEALTH CARE EDUCATION/TRAINING PROGRAM

## 2024-09-05 PROCEDURE — 52356 CYSTO/URETERO W/LITHOTRIPSY: CPT | Performed by: UROLOGY

## 2024-09-05 PROCEDURE — C1747: HCPCS | Performed by: UROLOGY

## 2024-09-05 DEVICE — URETERAL STENT WITH SIDE HOLES 6FX26CM
Type: IMPLANTABLE DEVICE | Site: URETER | Status: FUNCTIONAL
Brand: TRIA™ SOFT

## 2024-09-05 RX ORDER — PROCHLORPERAZINE EDISYLATE 5 MG/ML
10 INJECTION INTRAMUSCULAR; INTRAVENOUS ONCE AS NEEDED
Status: DISCONTINUED | OUTPATIENT
Start: 2024-09-05 | End: 2024-09-05 | Stop reason: HOSPADM

## 2024-09-05 RX ORDER — DOCUSATE SODIUM 100 MG/1
100 CAPSULE, LIQUID FILLED ORAL 2 TIMES DAILY
Qty: 15 CAPSULE | Refills: 1 | Status: SHIPPED | OUTPATIENT
Start: 2024-09-05

## 2024-09-05 RX ORDER — IPRATROPIUM BROMIDE AND ALBUTEROL SULFATE 2.5; .5 MG/3ML; MG/3ML
3 SOLUTION RESPIRATORY (INHALATION) ONCE AS NEEDED
Status: DISCONTINUED | OUTPATIENT
Start: 2024-09-05 | End: 2024-09-05 | Stop reason: HOSPADM

## 2024-09-05 RX ORDER — HYDRALAZINE HYDROCHLORIDE 20 MG/ML
5 INJECTION INTRAMUSCULAR; INTRAVENOUS
Status: DISCONTINUED | OUTPATIENT
Start: 2024-09-05 | End: 2024-09-05 | Stop reason: HOSPADM

## 2024-09-05 RX ORDER — ONDANSETRON 2 MG/ML
INJECTION INTRAMUSCULAR; INTRAVENOUS AS NEEDED
Status: DISCONTINUED | OUTPATIENT
Start: 2024-09-05 | End: 2024-09-05 | Stop reason: SURG

## 2024-09-05 RX ORDER — TAMSULOSIN HYDROCHLORIDE 0.4 MG/1
1 CAPSULE ORAL NIGHTLY
Qty: 10 CAPSULE | Refills: 0 | Status: SHIPPED | OUTPATIENT
Start: 2024-09-05

## 2024-09-05 RX ORDER — LABETALOL HYDROCHLORIDE 5 MG/ML
5 INJECTION, SOLUTION INTRAVENOUS
Status: DISCONTINUED | OUTPATIENT
Start: 2024-09-05 | End: 2024-09-05 | Stop reason: HOSPADM

## 2024-09-05 RX ORDER — SODIUM CHLORIDE 9 MG/ML
100 INJECTION, SOLUTION INTRAVENOUS CONTINUOUS
Status: DISCONTINUED | OUTPATIENT
Start: 2024-09-05 | End: 2024-09-05 | Stop reason: HOSPADM

## 2024-09-05 RX ORDER — SODIUM CHLORIDE, SODIUM LACTATE, POTASSIUM CHLORIDE, CALCIUM CHLORIDE 600; 310; 30; 20 MG/100ML; MG/100ML; MG/100ML; MG/100ML
INJECTION, SOLUTION INTRAVENOUS CONTINUOUS PRN
Status: DISCONTINUED | OUTPATIENT
Start: 2024-09-05 | End: 2024-09-05

## 2024-09-05 RX ORDER — ACETAMINOPHEN 325 MG/1
975 TABLET ORAL ONCE
Status: COMPLETED | OUTPATIENT
Start: 2024-09-05 | End: 2024-09-05

## 2024-09-05 RX ORDER — CEFDINIR 300 MG/1
300 CAPSULE ORAL 2 TIMES DAILY
Qty: 10 CAPSULE | Refills: 0 | Status: SHIPPED | OUTPATIENT
Start: 2024-09-05 | End: 2024-09-10

## 2024-09-05 RX ORDER — GENTAMICIN 40 MG/ML
80 INJECTION, SOLUTION INTRAMUSCULAR; INTRAVENOUS ONCE
Status: DISCONTINUED | OUTPATIENT
Start: 2024-09-05 | End: 2024-09-05

## 2024-09-05 RX ORDER — KETOROLAC TROMETHAMINE 30 MG/ML
INJECTION, SOLUTION INTRAMUSCULAR; INTRAVENOUS AS NEEDED
Status: DISCONTINUED | OUTPATIENT
Start: 2024-09-05 | End: 2024-09-05 | Stop reason: SURG

## 2024-09-05 RX ORDER — OXYCODONE HYDROCHLORIDE 5 MG/1
5 TABLET ORAL EVERY 6 HOURS PRN
Qty: 5 TABLET | Refills: 0 | Status: SHIPPED | OUTPATIENT
Start: 2024-09-05

## 2024-09-05 RX ORDER — GENTAMICIN 40 MG/ML
INJECTION, SOLUTION INTRAMUSCULAR; INTRAVENOUS AS NEEDED
Status: DISCONTINUED | OUTPATIENT
Start: 2024-09-05 | End: 2024-09-05 | Stop reason: SURG

## 2024-09-05 RX ORDER — CLINDAMYCIN PHOSPHATE 900 MG/50ML
900 INJECTION, SOLUTION INTRAVENOUS ONCE
Status: COMPLETED | OUTPATIENT
Start: 2024-09-05 | End: 2024-09-05

## 2024-09-05 RX ORDER — MAGNESIUM HYDROXIDE 1200 MG/15ML
LIQUID ORAL AS NEEDED
Status: DISCONTINUED | OUTPATIENT
Start: 2024-09-05 | End: 2024-09-05 | Stop reason: HOSPADM

## 2024-09-05 RX ORDER — PROPOFOL 10 MG/ML
VIAL (ML) INTRAVENOUS AS NEEDED
Status: DISCONTINUED | OUTPATIENT
Start: 2024-09-05 | End: 2024-09-05 | Stop reason: SURG

## 2024-09-05 RX ORDER — ACETAMINOPHEN 500 MG
1000 TABLET ORAL EVERY 6 HOURS
Qty: 30 TABLET | Refills: 0 | Status: SHIPPED | OUTPATIENT
Start: 2024-09-05 | End: 2024-09-08

## 2024-09-05 RX ORDER — MIDAZOLAM HYDROCHLORIDE 2 MG/2ML
INJECTION, SOLUTION INTRAMUSCULAR; INTRAVENOUS AS NEEDED
Status: DISCONTINUED | OUTPATIENT
Start: 2024-09-05 | End: 2024-09-05 | Stop reason: SURG

## 2024-09-05 RX ORDER — DEXAMETHASONE SODIUM PHOSPHATE 4 MG/ML
INJECTION, SOLUTION INTRA-ARTICULAR; INTRALESIONAL; INTRAMUSCULAR; INTRAVENOUS; SOFT TISSUE AS NEEDED
Status: DISCONTINUED | OUTPATIENT
Start: 2024-09-05 | End: 2024-09-05 | Stop reason: SURG

## 2024-09-05 RX ORDER — OXYBUTYNIN CHLORIDE 10 MG/1
10 TABLET, EXTENDED RELEASE ORAL DAILY PRN
Qty: 10 TABLET | Refills: 0 | Status: SHIPPED | OUTPATIENT
Start: 2024-09-05

## 2024-09-05 RX ORDER — FENTANYL CITRATE 50 UG/ML
INJECTION, SOLUTION INTRAMUSCULAR; INTRAVENOUS AS NEEDED
Status: DISCONTINUED | OUTPATIENT
Start: 2024-09-05 | End: 2024-09-05 | Stop reason: SURG

## 2024-09-05 RX ORDER — ONDANSETRON 2 MG/ML
4 INJECTION INTRAMUSCULAR; INTRAVENOUS ONCE AS NEEDED
Status: DISCONTINUED | OUTPATIENT
Start: 2024-09-05 | End: 2024-09-05 | Stop reason: HOSPADM

## 2024-09-05 RX ORDER — IOPAMIDOL 612 MG/ML
INJECTION, SOLUTION INTRAVASCULAR AS NEEDED
Status: DISCONTINUED | OUTPATIENT
Start: 2024-09-05 | End: 2024-09-05 | Stop reason: HOSPADM

## 2024-09-05 RX ORDER — MEPERIDINE HYDROCHLORIDE 25 MG/ML
12.5 INJECTION INTRAMUSCULAR; INTRAVENOUS; SUBCUTANEOUS
Status: DISCONTINUED | OUTPATIENT
Start: 2024-09-05 | End: 2024-09-05 | Stop reason: HOSPADM

## 2024-09-05 RX ORDER — LORAZEPAM 2 MG/ML
1 INJECTION INTRAMUSCULAR
Status: DISCONTINUED | OUTPATIENT
Start: 2024-09-05 | End: 2024-09-05 | Stop reason: HOSPADM

## 2024-09-05 RX ADMIN — HYDROCODONE BITARTRATE AND ACETAMINOPHEN 1 TABLET: 5; 325 TABLET ORAL at 05:07

## 2024-09-05 RX ADMIN — HYDROMORPHONE HYDROCHLORIDE 0.5 MG: 1 INJECTION, SOLUTION INTRAMUSCULAR; INTRAVENOUS; SUBCUTANEOUS at 13:37

## 2024-09-05 RX ADMIN — DEXAMETHASONE SODIUM PHOSPHATE 8 MG: 4 INJECTION, SOLUTION INTRA-ARTICULAR; INTRALESIONAL; INTRAMUSCULAR; INTRAVENOUS; SOFT TISSUE at 12:11

## 2024-09-05 RX ADMIN — CLINDAMYCIN IN 5 PERCENT DEXTROSE 900 MG: 18 INJECTION, SOLUTION INTRAVENOUS at 12:07

## 2024-09-05 RX ADMIN — SODIUM CHLORIDE 100 ML/HR: 9 INJECTION, SOLUTION INTRAVENOUS at 11:37

## 2024-09-05 RX ADMIN — ONDANSETRON 4 MG: 2 INJECTION INTRAMUSCULAR; INTRAVENOUS at 02:18

## 2024-09-05 RX ADMIN — MORPHINE SULFATE 2 MG: 4 INJECTION, SOLUTION INTRAMUSCULAR; INTRAVENOUS at 08:26

## 2024-09-05 RX ADMIN — MIDAZOLAM HYDROCHLORIDE 2 MG: 1 INJECTION, SOLUTION INTRAMUSCULAR; INTRAVENOUS at 12:07

## 2024-09-05 RX ADMIN — LIDOCAINE HYDROCHLORIDE 60 MG: 20 INJECTION, SOLUTION INTRAVENOUS at 12:11

## 2024-09-05 RX ADMIN — GENTAMICIN SULFATE 300 MG: 40 INJECTION, SOLUTION INTRAMUSCULAR; INTRAVENOUS at 12:23

## 2024-09-05 RX ADMIN — KETOROLAC TROMETHAMINE 30 MG: 30 INJECTION, SOLUTION INTRAMUSCULAR; INTRAVENOUS at 01:05

## 2024-09-05 RX ADMIN — HYDROMORPHONE HYDROCHLORIDE 0.5 MG: 1 INJECTION, SOLUTION INTRAMUSCULAR; INTRAVENOUS; SUBCUTANEOUS at 13:22

## 2024-09-05 RX ADMIN — KETOROLAC TROMETHAMINE 30 MG: 30 INJECTION, SOLUTION INTRAMUSCULAR at 12:46

## 2024-09-05 RX ADMIN — PROPOFOL 200 MG: 10 INJECTION, EMULSION INTRAVENOUS at 12:11

## 2024-09-05 RX ADMIN — HYDROMORPHONE HYDROCHLORIDE 0.5 MG: 1 INJECTION, SOLUTION INTRAMUSCULAR; INTRAVENOUS; SUBCUTANEOUS at 13:53

## 2024-09-05 RX ADMIN — Medication 30 MG: at 12:11

## 2024-09-05 RX ADMIN — SODIUM CHLORIDE 100 ML/HR: 9 INJECTION, SOLUTION INTRAVENOUS at 08:26

## 2024-09-05 RX ADMIN — GENTAMICIN SULFATE 300 MG: 40 INJECTION, SOLUTION INTRAMUSCULAR; INTRAVENOUS at 11:36

## 2024-09-05 RX ADMIN — ONDANSETRON 4 MG: 2 INJECTION INTRAMUSCULAR; INTRAVENOUS at 12:11

## 2024-09-05 RX ADMIN — FENTANYL CITRATE 50 MCG: 50 INJECTION, SOLUTION INTRAMUSCULAR; INTRAVENOUS at 12:11

## 2024-09-05 RX ADMIN — ACETAMINOPHEN 975 MG: 325 TABLET, FILM COATED ORAL at 11:32

## 2024-09-05 NOTE — H&P
[facility]   HISTORY AND PHYSICAL      Name:  Mary Kate Louise   Age:  44 y.o.  Sex:  female  :  1980  MRN:  [id number]   Visit Number:  [id number]  Admission Date:  2024  Date Of Service:  24  Primary Care Physician:  Viry Munson APRN    Chief Complaint:     Flank pain    History Of Presenting Illness:      Mary Kate Louise is a 44-year-old woman with past medical history of kidney stones.  Recent ureter stent placement.  She followed up with outpatient urology on , still having intractable flank pain, was taking cefdinir twice daily.  Was directed to come to [facility] ED for evaluation.  Denied fevers or chills, nausea/vomiting/diarrhea, dysuria.    ED summary: Afebrile, tachycardic, other vital signs stable on room air.  Lab work unremarkable.  Strong pattern of infection on urinalysis.  She was provided Dilaudid, Toradol, morphine, Zofran, 1 L NS bolus.    Review Of Systems:    All systems were reviewed and negative except as mentioned in history of presenting illness, assessment and plan.    Past Medical History: Patient  has a past medical history of Kidney stone.    Past Surgical History: Patient  has a past surgical history that includes Hysterectomy ();  section (03); Kidney stone surgery (24); and Tonsillectomy (N/A, ).    Social History: Patient  reports that she has never smoked. She has never been exposed to tobacco smoke. She has never used smokeless tobacco. She reports that she does not drink alcohol and does not use drugs.    Family History:  Patient's family history has been reviewed and found to be noncontributory.     Allergies:      Amoxicillin, Amoxicillin-pot clavulanate, and Levofloxacin    Home Medications:    Prior to Admission Medications       Prescriptions Last Dose Informant Patient Reported? Taking?    cefdinir (OMNICEF) 300 MG capsule   Yes No    Take 1 capsule by mouth.    Patient not taking:  Reported on 2024     "cyclobenzaprine (FLEXERIL) 10 MG tablet   Yes No    Take 1 tablet by mouth 3 times a day.    Patient not taking:  Reported on 9/4/2024    Uribel 81.6 MG tablet tablet   Yes No    Take 1 tablet by mouth 3 (Three) Times a Day As Needed. for pain    Patient not taking:  Reported on 9/4/2024          ED Medications:    Medications   sodium chloride 0.9 % flush 10 mL (has no administration in time range)   ondansetron (ZOFRAN) injection 4 mg (4 mg Intravenous Given 9/4/24 1623)   ketorolac (TORADOL) injection 15 mg (15 mg Intravenous Given 9/4/24 1623)   sodium chloride 0.9 % bolus 1,000 mL (0 mL Intravenous Stopped 9/4/24 1809)   morphine injection 4 mg (4 mg Intravenous Given 9/4/24 1731)   HYDROmorphone (DILAUDID) injection 1 mg (1 mg Intravenous Given 9/4/24 2013)     Vital Signs:  Temp:  [97.2 °F (36.2 °C)-98.2 °F (36.8 °C)] 98.2 °F (36.8 °C)  Heart Rate:  [] 107  Resp:  [15-20] 18  BP: (122-144)/() 138/100        09/04/24  1534   Weight: 99.8 kg (220 lb)     Body mass index is 35.51 kg/m².    Physical Exam:     Most recent vital Signs: /100   Pulse 107   Temp 98.2 °F (36.8 °C) (Oral)   Resp 18   Ht 167.6 cm (66\")   Wt 99.8 kg (220 lb)   SpO2 96%   BMI 35.51 kg/m²     Physical Exam  Constitutional:       General: She is not in acute distress.     Appearance: She is obese. She is ill-appearing. She is not toxic-appearing.   HENT:      Mouth/Throat:      Mouth: Mucous membranes are moist.   Eyes:      Extraocular Movements: Extraocular movements intact.   Cardiovascular:      Rate and Rhythm: Normal rate and regular rhythm.      Pulses: Normal pulses.      Heart sounds: Normal heart sounds.   Pulmonary:      Effort: Pulmonary effort is normal.      Breath sounds: Normal breath sounds.   Abdominal:      Palpations: Abdomen is soft.      Comments: Left flank pain   Musculoskeletal:      Right lower leg: No edema.      Left lower leg: No edema.   Skin:     General: Skin is warm.      Capillary " Refill: Capillary refill takes less than 2 seconds.   Neurological:      General: No focal deficit present.      Mental Status: She is alert and oriented to person, place, and time.   Psychiatric:         Mood and Affect: Mood normal.         Thought Content: Thought content normal.         Laboratory data:    I have reviewed the labs done in the emergency room.    Results from last 7 days   Lab Units 09/04/24  1622   SODIUM mmol/L 141   POTASSIUM mmol/L 4.0   CHLORIDE mmol/L 102   CO2 mmol/L 24.4   BUN mg/dL 14   CREATININE mg/dL 0.93   CALCIUM mg/dL 9.7   GLUCOSE mg/dL 95     Results from last 7 days   Lab Units 09/04/24  1622   WBC 10*3/mm3 9.78   HEMOGLOBIN g/dL 15.3   HEMATOCRIT % 44.9   PLATELETS 10*3/mm3 326                             Results from last 7 days   Lab Units 09/04/24  1813 09/04/24  1518   COLOR UA  Yellow Green*   SPECIFIC GRAVITY, URINE   --  1.030   GLUCOSE UA  Negative  --    KETONES UA  15 mg/dL (1+)* Negative   BLOOD UA  Large (3+)*  --    LEUKOCYTES UA  Small (1+)* Small (1+)*   PH, URINE  6.5 6.5   BILIRUBIN UA  Negative Small (1+)*   UROBILINOGEN UA  0.2 E.U./dL Normal   RBC UA /HPF Too Numerous to Count*  --    WBC UA /HPF Unable to determine due to loaded field*  --        Pain Management Panel           No data to display                EKG:      None    Radiology:    No radiology results for the last 3 days    Assessment/Plan:    Observation general floor admission 9/4/2024 with intractable pain status post ureter stent placement for ureter stone.    At time of admission resting in bed, appears comfortably ill.    Intractable pain  Status post ureter stent placement  Ureter stone  UTI.  IVF.  Pain control.  Nausea control.  Urology consultation, recommendations appreciated.  N.p.o. midnight.  Rocephin.  Urine culture.      Risk Assessment: High  DVT Prophylaxis: SCDs  Code Status: Full code  Diet: Regular, n.p.o. midnight    Advance Care Planning   ACP discussion was held with the  patient during this visit. Patient does not have an advance directive, information provided.           Brian Joseph Kerley, DO  09/04/24  21:38 EDT    Dictated utilizing Dragon dictation.

## 2024-09-05 NOTE — PLAN OF CARE
Goal Outcome Evaluation:  Plan of Care Reviewed With: patient, mother        Progress: no change       Problem: Adult Inpatient Plan of Care  Goal: Plan of Care Review  9/5/2024 0540 by Leona Iniguez LPN  Outcome: Ongoing, Progressing  Flowsheets (Taken 9/5/2024 0540)  Progress: no change  Plan of Care Reviewed With:   patient   mother  9/5/2024 0536 by Leona Iniguez LPN  Outcome: Ongoing, Progressing  Goal: Patient-Specific Goal (Individualized)  9/5/2024 0540 by Leona Iniguez LPN  Outcome: Ongoing, Progressing  9/5/2024 0536 by Leona Iniguez LPN  Outcome: Ongoing, Progressing  Goal: Absence of Hospital-Acquired Illness or Injury  9/5/2024 0540 by Leona Iniguez LPN  Outcome: Ongoing, Progressing  9/5/2024 0536 by Leona Iniguez LPN  Outcome: Ongoing, Progressing  Intervention: Identify and Manage Fall Risk  Recent Flowsheet Documentation  Taken 9/5/2024 0400 by Leona Iniguez LPN  Safety Promotion/Fall Prevention:   activity supervised   assistive device/personal items within reach   clutter free environment maintained   fall prevention program maintained   nonskid shoes/slippers when out of bed   room organization consistent   safety round/check completed  Taken 9/5/2024 0200 by Leona Iniguez LPN  Safety Promotion/Fall Prevention:   activity supervised   assistive device/personal items within reach   clutter free environment maintained   fall prevention program maintained   nonskid shoes/slippers when out of bed   room organization consistent   safety round/check completed  Taken 9/5/2024 0000 by Tincher, Leona, LPN  Safety Promotion/Fall Prevention:   activity supervised   assistive device/personal items within reach   clutter free environment maintained   fall prevention program maintained   nonskid shoes/slippers when out of bed   room organization consistent   safety round/check completed  Taken 9/4/2024 2224 by Tincher, Leona, LPN  Safety Promotion/Fall  Prevention:   activity supervised   assistive device/personal items within reach   clutter free environment maintained   fall prevention program maintained   nonskid shoes/slippers when out of bed   safety round/check completed   room organization consistent  Intervention: Prevent Skin Injury  Recent Flowsheet Documentation  Taken 9/5/2024 0400 by Leona Iniguez LPN  Body Position: supine, legs elevated  Taken 9/5/2024 0200 by Leona Iniguez LPN  Body Position: sitting up in bed  Taken 9/5/2024 0000 by Leona Iniguez LPN  Body Position: position changed independently  Taken 9/4/2024 2224 by Leona Iniguez LPN  Body Position:   position changed independently   sitting up in bed  Intervention: Prevent and Manage VTE (Venous Thromboembolism) Risk  Recent Flowsheet Documentation  Taken 9/5/2024 0400 by Leona Iniguez LPN  Activity Management: activity minimized  Taken 9/5/2024 0200 by Leona Iniguez LPN  Activity Management: activity minimized  Taken 9/5/2024 0000 by Leona Iniguez LPN  Activity Management: activity minimized  Taken 9/4/2024 2224 by Leona Iniguez LPN  Activity Management: activity minimized  VTE Prevention/Management:   bilateral   sequential compression devices off  Goal: Optimal Comfort and Wellbeing  9/5/2024 0540 by Leona Iniguez LPN  Outcome: Ongoing, Progressing  9/5/2024 0536 by Leona Iniguez LPN  Outcome: Ongoing, Progressing  Intervention: Provide Person-Centered Care  Recent Flowsheet Documentation  Taken 9/4/2024 2224 by Leona Iniguez LPN  Trust Relationship/Rapport:   care explained   choices provided   emotional support provided  Goal: Readiness for Transition of Care  9/5/2024 0540 by Leona Iniguez LPN  Outcome: Ongoing, Progressing  9/5/2024 0536 by Leona Iniguez LPN  Outcome: Ongoing, Progressing  Intervention: Mutually Develop Transition Plan  Recent Flowsheet Documentation  Taken 9/4/2024 2231 by Leona Iniguez  LPN  Equipment Currently Used at Home: none  Transportation Anticipated: family or friend will provide  Patient/Family Anticipated Services at Transition: none  Patient/Family Anticipates Transition to: home     Problem: Fall Injury Risk  Goal: Absence of Fall and Fall-Related Injury  Outcome: Ongoing, Progressing  Intervention: Promote Injury-Free Environment  Recent Flowsheet Documentation  Taken 9/5/2024 0400 by Leona Iniguez LPN  Safety Promotion/Fall Prevention:   activity supervised   assistive device/personal items within reach   clutter free environment maintained   fall prevention program maintained   nonskid shoes/slippers when out of bed   room organization consistent   safety round/check completed  Taken 9/5/2024 0200 by Leona Iniguez LPN  Safety Promotion/Fall Prevention:   activity supervised   assistive device/personal items within reach   clutter free environment maintained   fall prevention program maintained   nonskid shoes/slippers when out of bed   room organization consistent   safety round/check completed  Taken 9/5/2024 0000 by Tincher, Leona, LPN  Safety Promotion/Fall Prevention:   activity supervised   assistive device/personal items within reach   clutter free environment maintained   fall prevention program maintained   nonskid shoes/slippers when out of bed   room organization consistent   safety round/check completed  Taken 9/4/2024 2224 by Tincher, Leona, LPN  Safety Promotion/Fall Prevention:   activity supervised   assistive device/personal items within reach   clutter free environment maintained   fall prevention program maintained   nonskid shoes/slippers when out of bed   safety round/check completed   room organization consistent     Problem: Pain Acute  Goal: Acceptable Pain Control and Functional Ability  Outcome: Ongoing, Progressing  Intervention: Optimize Psychosocial Wellbeing  Recent Flowsheet Documentation  Taken 9/4/2024 2224 by Leona Iniguez  LPN  Diversional Activities: television     Problem: Infection  Goal: Absence of Infection Signs and Symptoms  Outcome: Ongoing, Progressing

## 2024-09-05 NOTE — OP NOTE
Preoperative diagnosis  left kidney stone    Postoperative diagnosis  left kidney stone    Procedure performed  1.  Flexible cystoscopy, left retrograde pyelogram with ureteral stent pexchange - 72606  2.  left ureteroscopy with holmium-YAG laser lithotripsy (75513)   3.  Fluoroscopy time < 1 hour with interpretation of images    Surgeon  Linus Marie MD    Anesthesia  General    Complications  None     Specimen  Stone fragments for biochemical analysis    EBL  Minimal    Findings  Urethroscopy revealed no strictures or other abnormalities.  Cystoscopy revealed no tumors, stones or other mucosal abnormalities.  Retrograde pyelogram revealed a delicate system with identification of the stone seen on pre-op imaging.  No other filling defects and caliber of the ureter was smooth and normal.    Ureteroscopy revealed large left renal stone completely lasered and evacuated.     Indications  44 y.o. female agreed to undergo the above named procedure after discussion of the alternatives, risks and benefits.  Informed consent was obtained.      Procedure  The patient was taken to the operating room, identified by name and medical record number, and placed supine on the operating table.  Pre-operative antibiotics were administered.  Bilateral lower extremity SCDs were placed.  After induction of general anesthesia the patient was positioned in dorsal lithotomy, prepped and draped in a sterile fashion.  A time-out was performed.      A 14-Korean flexible cystoscope was passed carefully via urethra into the bladder.  The ureteral orifice was identified and a Sensor wire was passed retrograde to the level of the kidney and confirmed by fluoroscopy. Previous indwelling stent grasped and removed. The flexible scope was off-loaded and the bladder emptied with a straight catheter.  A dual lumen open-ended catheter was passed over the wire. A retrograde pyelogram was performed by slowly injecting 5 mL of 50% Omnipaque contrast via  the catheter with findings described above.  An Amplatz super-stiff was placed to the level of the renal pelvis and confirmed by fluoroscopy. The dual lumen was removed. The Sensor wire was clipped to the drape as a safety wire.  A ureteral access sheath was advanced over the super-stiff wire to level of the UPJ under direct fluoroscopic guidance. The kidney was entered with the flexible ureteroscope.  The kidney stone was identified and fragmented with a 200-micron  laser fiber at laser settings of  0.3 joules and a frequency of 120 Hz. At the completion of the procedure, all clinically significant fragments were removed and only dust-like fragments remained.  The access sheath was removed under direct vision.  Ureteral edema but no obvious obstruction was present. A retrograde pyelogram was performed and a  6 Fr x 26 cm stent was positioned with the upper end in the kidney and the lower in the bladder confirmed by fluoroscopy. The bladder was emptied and the procedure was complete. The patient tolerated the procedure well and was stable throughout.    The patient will follow up with me next week for stent removal.

## 2024-09-05 NOTE — DISCHARGE SUMMARY
[facility]   DISCHARGE SUMMARY      Name:  Mary Kate Louise   Age:  44 y.o.  Sex:  female  :  1980  MRN:  [id number]   Visit Number:  [id number]    Admission Date:  2024  Date of Discharge:  2024  Primary Care Physician:  Viry Munson APRN    Important issues to note:    -Patient admitted with intractable pain with recent nephrolithiasis with obstruction status post stent placement in [place].  Patient taken to OR per Dr. Marie urology status post lithotripsy with stent exchange.  Postoperatively patient pain improved and agreeable for discharge.  Strict return precautions given.  Patient will follow-up with urology in 1 week for stent removal.  Given 5 further days of cefdinir due to strong pattern of UTI on urinalysis upon admission.  Urine culture pending.    Discharge Diagnoses:     Intractable pain with recent nephrolithiasis with obstruction status post stent placement, POA  Acute UTI, POA    Problem List:     Active Hospital Problems    Diagnosis  POA    **Left ureteral stone [N20.1]  Yes    Ureteral stone [N20.1]  Yes      Resolved Hospital Problems   No resolved problems to display.     Presenting Problem:    Chief Complaint   Patient presents with    Flank Pain     Pt was sent here by Dr Keene office to be admitted and have a kidney stone removed tomorrow. They were told we didn't have any beds and we would admit through the ED.       Consults:     Consulting Physician(s)         Provider   Role Specialty     Linus Marie MD      Consulting Physician Urology          Procedures Performed:    Procedure(s):  LEFT URETEROSCOPY LASER LITHOTRIPSY WITH STENT EXCHANGE    History of presenting illness/Hospital Course:    Mary Kate Louise is a 44-year-old woman with past medical history of kidney stones.  Recent ureter stent placement.  She followed up with outpatient urology on , still having intractable flank pain, was taking cefdinir twice daily.  Was directed  to come to [facility] ED for evaluation.  Denied fevers or chills, nausea/vomiting/diarrhea, dysuria.     ED summary: Afebrile, tachycardic, other vital signs stable on room air.  Lab work unremarkable.  Strong pattern of infection on urinalysis.  She was provided Dilaudid, Toradol, morphine, Zofran, 1 L NS bolus.  Patient taken the OR per Dr. Marie urology lithotripsy with stent exchange performed.  Given urinalysis upon admission appears infected will continue cefdinir x 5 days.  Urine culture pending.  Otherwise patient will follow-up outpatient in 1 week for stent removal.  Strict return precautions given.    Vital Signs:    Temp:  [97.4 °F (36.3 °C)-98.2 °F (36.8 °C)] 98.2 °F (36.8 °C)  Heart Rate:  [] 113  Resp:  [16-20] 16  BP: ()/() 136/79    Physical Exam:    General Appearance:  Alert and cooperative.  Well-appearing middle-aged female.   Head:  Atraumatic and normocephalic.   Eyes: Conjunctivae and sclerae normal, no icterus. No pallor.   Ears:  Ears with no abnormalities noted.   Throat: No oral lesions, no thrush, oral mucosa moist.   Neck: Supple, trachea midline, no thyromegaly.   Back:   No kyphoscoliosis present. No tenderness to palpation.   Lungs:   Breath sounds heard bilaterally equally.  No crackles or wheezing. No Pleural rub or bronchial breathing.  Unlabored on room air.   Heart:  Normal S1 and S2, no murmur, no gallop, no rub. No JVD.   Abdomen:   Normal bowel sounds, no masses, no organomegaly. Soft, nontender, nondistended, no rebound tenderness.   Extremities: Supple, no edema, no cyanosis, no clubbing.   Pulses: Pulses palpable bilaterally.   Skin: No bleeding or rash.   Neurologic: Alert and oriented x 3. No facial asymmetry. Moves all four limbs. No tremors.     Pertinent Lab Results:     Results from last 7 days   Lab Units 09/05/24  0646 09/04/24  1622   SODIUM mmol/L 137 141   POTASSIUM mmol/L 4.1 4.0   CHLORIDE mmol/L 105 102   CO2 mmol/L 21.9* 24.4   BUN mg/dL 16 14    CREATININE mg/dL 0.81 0.93   CALCIUM mg/dL 8.5* 9.7   GLUCOSE mg/dL 96 95     Results from last 7 days   Lab Units 09/05/24  0646 09/04/24  1622   WBC 10*3/mm3 7.99 9.78   HEMOGLOBIN g/dL 12.8 15.3   HEMATOCRIT % 38.5 44.9   PLATELETS 10*3/mm3 272 326                                   Pertinent Radiology Results:    Imaging Results (All)       Procedure Component Value Units Date/Time    CT Abdomen Pelvis Without Contrast [[id number]] Collected: 09/05/24 1103     Updated: 09/05/24 1109    Narrative:      PROCEDURE: CT ABDOMEN PELVIS WO CONTRAST-     HISTORY: Pain s/p stent kidney stone; N20.0-Calculus of kidney;  N20.1-Calculus of ureter     COMPARISON: August 24, 2024..     PROCEDURE: Axial images were obtained from the lung bases to the pubic  symphysis by computed tomography. This study was performed with  techniques to keep radiation doses as low as reasonably achievable,  (ALARA). Individualized dose reduction techniques using automated  exposure control or adjustment of mA and/or kV according to the patient  size were employed.     FINDINGS:     ABDOMEN: The lung bases are clear. The heart is proper size. The limited  non-contrast images of the liver are unremarkable. The spleen is  unremarkable. No adrenal masses are seen. The aorta is normal in  caliber. There is no significant free fluid or adenopathy. There is a  stable nonobstructing stone inferior pole right kidney. There is a  prominent right extrarenal pelvis versus mild UPJ obstruction;  appearance is stable from prior. Since the prior exam a left ureteral  stent has been placed with the proximal pigtail in the left renal pelvis  and the distal pigtail in the bladder. The previously described  hydronephrosis has resolved. The previously described UPJ stone is now  located in the inferior pole of the left kidney. There is no perirenal  stranding. There is no hydronephrosis. Gallbladder present and appears  mildly distended compared to prior exam but  no CT visible stones.     PELVIS: The GI tract demonstrate no obstruction. The appendix is  identified and appears normal. Uterus is diminutive or absent. The  urinary bladder is unremarkable. There is no fluid or adenopathy.        Impression:      Interval placement of left ureteral stent with resolution of  previously described hydronephrosis.     Bilateral nephrolithiasis as described.        CTDI: 11.8 mGy  DLP:595.14 mGy.cm     This report was signed and finalized on 9/5/2024 11:06 AM by Tamiko Platt MD.               Echo:      Condition on Discharge:      Stable.    Code status during the hospital stay:    Code Status and Medical Interventions: CPR (Attempt to Resuscitate); Full Support   Ordered at: 09/04/24 6809     Code Status (Patient has no pulse and is not breathing):    CPR (Attempt to Resuscitate)     Medical Interventions (Patient has pulse or is breathing):    Full Support     Discharge Disposition:    Home or Self Care    Discharge Medications:       Discharge Medications        New Medications        Instructions Start Date   acetaminophen 500 MG tablet  Commonly known as: TYLENOL   1,000 mg, Oral, Every 6 Hours      diclofenac 50 MG EC tablet  Commonly known as: VOLTAREN   50 mg, Oral, 2 Times Daily      docusate sodium 100 MG capsule  Commonly known as: Colace   100 mg, Oral, 2 Times Daily, If taking oxycodone      oxybutynin XL 10 MG 24 hr tablet  Commonly known as: Ditropan XL   10 mg, Oral, Daily PRN      oxyCODONE 5 MG immediate release tablet  Commonly known as: Roxicodone   5 mg, Oral, Every 6 Hours PRN      tamsulosin 0.4 MG capsule 24 hr capsule  Commonly known as: FLOMAX   0.4 mg, Oral, Nightly             Changes to Medications        Instructions Start Date   cefdinir 300 MG capsule  Commonly known as: OMNICEF  What changed: when to take this   300 mg, Oral, 2 Times Daily             Continue These Medications        Instructions Start Date   cyclobenzaprine 10 MG tablet  Commonly  known as: FLEXERIL   1 tablet, Oral, 3 times daily      Uribel 81.6 MG tablet tablet  Generic drug: methenamine-hyoscamine-methylene blue-benzoic acid   1 tablet, Oral, 3 Times Daily PRN, for pain             Discharge Diet:     Diet Instructions       Advance Diet As Tolerated -Target Diet: Regular.      Target Diet: Regular.    Diet: Regular/House Diet; Thin (IDDSI 0)      Discharge Diet: Regular/House Diet    Fluid Consistency: Thin (IDDSI 0)          Activity at Discharge:     Activity Instructions       Activity as Tolerated            Follow-up Appointments:     Follow-up Information       Viry Munson APRN Follow up in 1 week(s).    Specialties: Nurse Practitioner, Family Medicine  Contact information:  [address] [place], [place] [id number] [phone]                           Future Appointments   Date Time Provider Department Center   9/9/2024  9:30 AM Linus Marie MD [facility] [place] [facility]     Test Results Pending at Discharge:    Pending Labs       Order Current Status    STONE ANALYSIS - Calculus, Kidney, Left In process    Urine Culture - Urine, Urine, Clean Catch In process               ESTEBAN Maria  09/05/24  15:22 EDT    Time: I spent 38 minutes on this discharge activity which included: face-to-face encounter with the patient, reviewing the data in the system, coordination of the care with the nursing staff as well as consultants, documentation, and entering orders.     Dictated utilizing Dragon dictation.

## 2024-09-05 NOTE — CASE MANAGEMENT/SOCIAL WORK
Discharge Planning Assessment  [facility]     Patient Name: Mary Kate Louise  MRN: [id number]  Today's Date: 9/5/2024    Admit Date: 9/4/2024    Plan: Dcp initiated at bedside with pt providing demographics, mother in law present, permission given. Pt does not have a POA, AD, home DME, or financial stressors. Viry Munson is her primary and [facility] is pharmacy used, enrolled in meds to bed. Pt is independnet, drives, works full time. She plans to return home with family driving her, no needs identified.   Discharge Needs Assessment       Row Name 09/05/24 1054       Living Environment    People in Home child(devon), adult;spouse    Name(s) of People in Home Elvis Louise spouse    Current Living Arrangements home    Duration at Residence 6 yrs    Potentially Unsafe Housing Conditions none    In the past 12 months has the electric, gas, oil, or water company threatened to shut off services in your home? No    Primary Care Provided by self    Family Caregiver if Needed child(devon), adult;spouse    Quality of Family Relationships helpful;involved    Able to Return to Prior Arrangements yes       Resource/Environmental Concerns    Resource/Environmental Concerns none    Transportation Concerns none       Transportation Needs    In the past 12 months, has lack of transportation kept you from medical appointments or from getting medications? no    In the past 12 months, has lack of transportation kept you from meetings, work, or from getting things needed for daily living? No       Food Insecurity    Within the past 12 months, you worried that your food would run out before you got the money to buy more. Never true    Within the past 12 months, the food you bought just didn't last and you didn't have money to get more. Never true       Transition Planning    Patient/Family Anticipates Transition to home with family    Patient/Family Anticipated Services at Transition none    Transportation Anticipated family or  friend will provide       Discharge Needs Assessment    Readmission Within the Last 30 Days no previous admission in last 30 days    Concerns to be Addressed no discharge needs identified    Anticipated Changes Related to Illness none    Equipment Needed After Discharge none                   Discharge Plan       Row Name 09/05/24 1057       Plan    Plan Dcp initiated at bedside with pt providing demographics, mother in law present, permission given. Pt does not have a POA, AD, home DME, or financial stressors. Viry Munson is her primary and [facility] is pharmacy used, enrolled in meds to bed. Pt is independnet, drives, works full time. She plans to return home with family driving her, no needs identified.                  Continued Care and Services - Admitted Since 9/4/2024    No active coordination exists for this encounter.       Expected Discharge Date and Time       Expected Discharge Date Expected Discharge Time    Sep 6, 2024            Demographic Summary       Row Name 09/05/24 1052       General Information    Admission Type observation    Arrived From emergency department    Referral Source admission list    Reason for Consult discharge planning    Preferred Language English       Contact Information    Permission Granted to Share Info With family/designee                   Functional Status       Row Name 09/05/24 1053       Functional Status    Usual Activity Tolerance excellent    Current Activity Tolerance excellent       Physical Activity    On average, how many days per week do you engage in moderate to strenuous exercise (like a brisk walk)? 4 days    On average, how many minutes do you engage in exercise at this level? 30 min    Number of minutes of exercise per week 120       Functional Status, IADL    Medications independent    Meal Preparation independent    Housekeeping independent    Laundry independent    Shopping independent       Mental Status    General Appearance WDL WDL        Mental Status Summary    Recent Changes in Mental Status/Cognitive Functioning no changes       Employment/    Employment Status employed full-time                   Psychosocial    No documentation.                  Abuse/Neglect    No documentation.                  Legal    No documentation.                  Substance Abuse    No documentation.                  Patient Forms    No documentation.                     Jamilah Heck RN

## 2024-09-05 NOTE — DISCHARGE INSTRUCTIONS
Home Care After Ureteroscopy and Laser Lithotripsy  The following instructions will help you care for yourself, or be cared for upon your return home today.    These are guidelines for your care right after surgery only.     Diet  Drink plenty of liquids and eat light meals today.    Start your regular diet tomorrow.    Activity  Start normal activities in twenty-four (24) hours.    Wound Care and Hygiene  No restrictions, start normal routine.    Anesthesia Precautions & Expectations  After anesthesia, rest for 24 hours.    Do not drive, drink alcoholic beverages or make any important decisions during this time.  General anesthesia may cause a sore throat, jaw discomfort or muscle aches.    These symptoms can last for one or two days.     What to Expect after Surgery  Mild pain with voiding.  Frequency or urgency.  Bladder cramps.  Minimal bleeding with voiding.    Call your Doctor  Passing clots in urine preventing bladder emptying  Severe pain not controlled by oral medication  Temperature above 101.5 degrees  Inability to urinate within eight (8) hours after surgery    After Stent Placement  It is common to have blood tinged urine for 3-5 days.  It is common to have pain in your side and in your back when you urinate for 3-5 days.  It is common to have urgency with urination.  This is a temporary stent and will need to be removed in the office in 1 week.  Do not take the Pyridium 24 hours prior to your stent removal.    Other Contacts  Urology Office:  [id number]   [place] [address] [phone] office  [phone] fax    Other Instructions  Take tamsulosin daily until the stent comes out.  Take oxybutynin daily as needed for bladder spasm while the stent is in.  Take Pyridium up to 3 times daily as needed for burning with urination.   Take Tylenol scheduled every 6 hours for the first 3 days.  Take the oxycodone on top of that as needed.  Take all of the antibiotics.     Follow up  Appointment  1 week for stent removal. Please See After visit Summary. Call if you do not have an appt already scheduled.       Patient to be discharged home.  Increase water intake.  Follow-up with PCP in 1 week as well.  Return to emergency department for any worsening symptoms.

## 2024-09-05 NOTE — ANESTHESIA PREPROCEDURE EVALUATION
Anesthesia Evaluation     Patient summary reviewed and Nursing notes reviewed   NPO Solid Status: > 8 hours  NPO Liquid Status: > 8 hours           Airway   Mallampati: II  TM distance: >3 FB  Neck ROM: full  Possible difficult intubation  Dental - normal exam     Pulmonary - negative pulmonary ROS and normal exam   Cardiovascular - negative cardio ROS and normal exam    Rhythm: regular  Rate: normal        Neuro/Psych- negative ROS  GI/Hepatic/Renal/Endo    (+) obesity, renal disease- stones    Musculoskeletal (-) negative ROS    Abdominal  - normal exam   Substance History - negative use     OB/GYN negative ob/gyn ROS         Other - negative ROS                   Anesthesia Plan    ASA 3     general     (Risks and benefits discussed including risk of aspiration, recall and dental damage. All patient questions answered.    Will continue with plan of care.Risks and benefits discussed including risk of aspiration, recall and dental damage. All patient questions answered.    Will continue with plan of care.)  intravenous induction     Anesthetic plan, risks, benefits, and alternatives have been provided, discussed and informed consent has been obtained with: patient.    CODE STATUS:    Code Status (Patient has no pulse and is not breathing): CPR (Attempt to Resuscitate)  Medical Interventions (Patient has pulse or is breathing): Full Support

## 2024-09-05 NOTE — CONSULTS
CC  Chief Complaint   Patient presents with    Flank Pain     Pt was sent here by Dr Keene office to be admitted and have a kidney stone removed tomorrow. They were told we didn't have any beds and we would admit through the ED.         JENNIFER Louise is a 44 y.o. with history of   1. Left nephrolithiasis    2. Left ureteral stone       Positive intractable left flank pain  No recent fevers or new LUTS  Does not take any blood thinners    Past Medical History  Past Medical History:   Diagnosis Date    Kidney stone     Have a stent placed 24 and still have stone 7 mm       Past Surgical History  Past Surgical History:   Procedure Laterality Date     SECTION  03    HYSTERECTOMY      KIDNEY STONE SURGERY  24    Still have stent and stone 7mm    TONSILLECTOMY N/A        Medications    Current Facility-Administered Medications:     acetaminophen (TYLENOL) tablet 975 mg, 975 mg, Oral, Once, Ashanti Day N, APRN    Calcium Replacement - Follow Nurse / BPA Driven Protocol, , Does not apply, PRN, Kerley, Brian Joseph, DO    cefTRIAXone (ROCEPHIN) 2,000 mg in sodium chloride 0.9 % 100 mL IVPB-VTB, 2,000 mg, Intravenous, Q24H, Kerley, Brian Joseph, DO, Last Rate: 200 mL/hr at 24 2247, 2,000 mg at 24 2247    clindamycin (CLEOCIN) 900 mg in dextrose 5% 50 mL IVPB (premix), 900 mg, Intravenous, Once, Ashanti Day N, APRN    gentamicin (GARAMYCIN) injection 80 mg, 80 mg, Intravenous, Once, Ashanti Day, APRN    HYDROcodone-acetaminophen (NORCO) 5-325 MG per tablet 1 tablet, 1 tablet, Oral, Q6H PRN, Kerley, Brian Joseph, DO, 1 tablet at 24 0507    ketorolac (TORADOL) injection 30 mg, 30 mg, Intravenous, Q6H PRN, Kerley, Brian Joseph, DO, 30 mg at 24 0105    Magnesium Standard Dose Replacement - Follow Nurse / BPA Driven Protocol, , Does not apply, PRN, Kerley, Brian Joseph, DO    morphine injection 2 mg, 2 mg, Intravenous, Q4H PRN, Ashia Talbot APRN, 2 mg at  09/05/24 0826    nitroglycerin (NITROSTAT) SL tablet 0.4 mg, 0.4 mg, Sublingual, Q5 Min PRN, Kerley, Brian Joseph, DO    ondansetron (ZOFRAN) injection 4 mg, 4 mg, Intravenous, Q6H PRN, Kerley, Brian Joseph, DO, 4 mg at 09/05/24 0218    Phosphorus Replacement - Follow Nurse / BPA Driven Protocol, , Does not apply, PRN, Kerley, Brian Joseph, DO    Potassium Replacement - Follow Nurse / BPA Driven Protocol, , Does not apply, PRN, Kerley, Brian Joseph, DO    [COMPLETED] Insert Peripheral IV, , , Once **AND** sodium chloride 0.9 % flush 10 mL, 10 mL, Intravenous, PRN, Kerley, Brian Joseph, DO    sodium chloride 0.9 % flush 10 mL, 10 mL, Intravenous, Q12H, Kerley, Brian Joseph, DO, 10 mL at 09/04/24 2247    sodium chloride 0.9 % flush 10 mL, 10 mL, Intravenous, PRN, Kerley, Brian Joseph, DO    sodium chloride 0.9 % infusion 40 mL, 40 mL, Intravenous, PRN, Kerley, Brian Joseph, DO    sodium chloride 0.9 % infusion, 100 mL/hr, Intravenous, Continuous, Kerley, Brian Joseph, DO, Last Rate: 100 mL/hr at 09/05/24 0826, 100 mL/hr at 09/05/24 0826    sodium chloride 0.9 % infusion, 100 mL/hr, Intravenous, Continuous, Ashanti Day, APRN, Last Rate: 100 mL/hr at 09/05/24 0944, 100 mL/hr at 09/05/24 0944    Allergies  Allergies   Allergen Reactions    Amoxicillin Hives    Amoxicillin-Pot Clavulanate Hives    Levofloxacin Hives       Social History  Social History     Socioeconomic History    Marital status:    Tobacco Use    Smoking status: Never     Passive exposure: Never    Smokeless tobacco: Never   Vaping Use    Vaping status: Never Used   Substance and Sexual Activity    Alcohol use: Never    Drug use: Never    Sexual activity: Yes     Partners: Male     Birth control/protection: Hysterectomy       Review of Systems  Constitutional: No fevers or chills  Skin: Negative for rash  Endocrine: No heat/cold intolerance   Cardiovascular: Negative for chest pain or dyspnea on exertion  Respiratory: Negative for shortness of  "breath or wheezing  Gastrointestinal: No constipation, nausea or vomiting  Genitourinary: Negative for new lower urinary tract symptoms, current gross hematuria or dysuria.  Musculoskeletal: Positive left flank pain  Neurological:  Negative for frequent headaches or dizziness  Lymph/Heme: Negative for leg swelling or calf pain.    Physical Exam  Visit Vitals  /80 (BP Location: Right arm, Patient Position: Sitting)   Pulse 91   Temp 97.9 °F (36.6 °C) (Oral)   Resp 17   Ht 167.6 cm (66\")   Wt 106 kg (233 lb 11 oz)   SpO2 99%   BMI 37.72 kg/m²     Constitutional: NAD, WDWN.   HEENT: NCAT. Conjunctivae normal.  MMM.    Cardiovascular: Regular rate.  Pulmonary/Chest: Respirations are even and unlabored bilaterally.  Abdominal: Soft. No distension, tenderness, masses or guarding. No CVA tenderness.  Neurological: A + O x 3.  Cranial Nerves II-XII grossly intact. Normal gait.  Extremities: GERSON x 4, Warm. No clubbing.  No cyanosis.    Skin: Pink, warm and dry.  No rashes noted.  Psychiatric:  Normal mood and affect    Labs & Imaging  Lab Results   Component Value Date    GLUCOSE 96 09/05/2024    CALCIUM 8.5 (L) 09/05/2024     09/05/2024    K 4.1 09/05/2024    CO2 21.9 (L) 09/05/2024     09/05/2024    BUN 16 09/05/2024    CREATININE 0.81 09/05/2024    BCR 19.8 09/05/2024    ANIONGAP 10.1 09/05/2024     Lab Results   Component Value Date    WBC 7.99 09/05/2024    HGB 12.8 09/05/2024    HCT 38.5 09/05/2024    MCV 91.4 09/05/2024     09/05/2024     Brief Urine Lab Results  (Last result in the past 365 days)        Color   Clarity   Blood   Leuk Est   Nitrite   Protein   CREAT   Urine HCG        09/04/24 1813 Yellow   Cloudy   Large (3+)   Small (1+)   Negative   >=300 mg/dL (3+)                      CT Abdomen Pelvis Without Contrast    Result Date: 8/24/2024  CT-ABDOMEN \T\ PELVIS WO CONTRAST: 8/24/2024 18:38 EDT CLINICAL INFORMATION:: Flank pain COMPARISON: None. TECHNIQUE: Computed tomography of the  " abdomen, and pelvis was performed according to routine protocol without immediate complication. A dose lowering technique was used for this procedure, which may include, but is not limited to, dose reduction technique, automated exposure control, the use of iterative reconstruction, and ALARA (As Low As Reasonably Achievable) / Image Gently techniques. FINDINGS: Lower Chest: No acute infiltrate.  No pleural effusion. Abdomen: Liver: Suboptimal evaluation without intravenous contrast.  No suspicious hepatic masses. Bile Ducts: Normal caliber. Gallbladder: No calcified gallstones. Normal caliber wall. Pancreas: No pancreatic ductal dilatation.  No evidence of mass. Spleen: No evidence of splenomegaly. Adrenals: No suspicious adrenal enlargement. Kidneys: There is moderate to severe left hydronephrosis and dilatation of the proximal left ureter. A 7 mm calculus is found at the ureteral pelvic junction of the left kidney. There is a nonobstructing 5 mm calculus in the lower pole calyx of the right kidney. There is no evidence of any right hydronephrosis. Pelvis: Reproductive Organs: Uterus is not identified correlate with surgical history Ureters: Calculus proximal left ureter. Remaining portion of both ureters unremarkable. Bladder: Bladder is incompletely distended grossly unremarkable Appendix: Normal appearance of the appendix Bowel: Normal caliber. Mesenteric Lymph Nodes: No enlarged mesenteric lymph nodes. Peritoneum: No ascites or free air, no fluid collection. Vessels: Normal caliber aorta no aneurysm . Retroperitoneum: No retroperitoneal masses. No suspicious lymphadenopathy. Abdominal Wall: No large hernias or abdominal wall masses. Bones: Disc space narrowing L4-5 and L5-S1. No osseous destructive lesions      IMPRESSION: 1. Obstructive uropathy secondary to 7 mm calculus at the ureteral pelvic junction left kidney. 2. Right nephrolithiasis Electronically signed by Jo Hartmann  Dani is a 44 y.o. female who presents with an obstructing 7 mm left UPJ stone, status post stent placement at outside hospital, who presented with intractable pain and had to be admitted.  Unable to keep any liquids down and had dehydration    Plan  1. To OR for urgent left ureteroscopy laser lithotripsy with stent exchange  2.  She can go home later today from my standpoint if she is feeling well, is able to keep down fluids and pain is well-controlled at the discretion of the hospitalist.  I will write for her for pain medications, as well as medications for bladder and ureteral spasm  3.  She will need to follow-up with me next week for stent removal in clinic    Linus Marie MD

## 2024-09-06 LAB — BACTERIA SPEC AEROBE CULT: NO GROWTH

## 2024-09-06 NOTE — ANESTHESIA POSTPROCEDURE EVALUATION
Patient: Mary Kate Louise    Procedure Summary       Date: 09/05/24 Room / Location: [facility] /  [facility] OR    Anesthesia Start: 1206 Anesthesia Stop: 1253    Procedure: LEFT URETEROSCOPY LASER LITHOTRIPSY WITH STENT EXCHANGE (Left) Diagnosis:       Left ureteral stone      (Left ureteral stone [N20.1])    Surgeons: Linus Marie MD Provider: Joan Castellanos CRNA    Anesthesia Type: general ASA Status: 3            Anesthesia Type: general    Vitals  Vitals Value Taken Time   /87 09/05/24 1420   Temp 97.3 °F (36.3 °C) 09/05/24 1420   Pulse 88 09/05/24 1421   Resp 16 09/05/24 1420   SpO2 99 % 09/05/24 1421   Vitals shown include unfiled device data.        Post Anesthesia Care and Evaluation    Patient location during evaluation: bedside  Patient participation: complete - patient participated  Level of consciousness: awake and alert  Pain score: 0  Pain management: satisfactory to patient    Airway patency: patent  Anesthetic complications: No anesthetic complications  PONV Status: none  Cardiovascular status: acceptable and stable  Respiratory status: acceptable  Hydration status: acceptable    Comments: Vitals signs as noted in nursing documentation as per protocol.

## 2024-09-09 ENCOUNTER — PROCEDURE VISIT (OUTPATIENT)
Dept: UROLOGY | Facility: CLINIC | Age: 44
End: 2024-09-09
Payer: COMMERCIAL

## 2024-09-09 DIAGNOSIS — N20.0 KIDNEY STONE: Primary | ICD-10-CM

## 2024-09-09 PROCEDURE — 52310 CYSTOSCOPY AND TREATMENT: CPT | Performed by: UROLOGY

## 2024-09-09 NOTE — PROGRESS NOTES
Preoperative diagnosis  Foreign body in genitourinary tract    Postoperative diagnosis  Foreign body in genitourinary tract    Procedure  Flexible cystourethroscopy with stent removal    Attending surgeon  Linus Marie MD    Anesthesia  2% lidocaine jelly intraurethrally    Complications  None    Indications  44 y.o. female who is status post ureteroscopy with laser lithotripsy who presents for stent removal.    Procedure  Detailed information of all possible complications and side effects were discussed with the patient.  Informed consent was obtained. Patient was given one dose of antibiotics. The patient was placed in supine position and a timeout was performed. The patient was prepped and draped in sterile fashion.  Next, 2% lidocaine jelly was bluntly injected per urethra without difficulty. The 14 Pashto flexible cystoscope was passed through the urethra and into the bladder.  The stent was visualized, grasped and removed in its entirety.  The patient tolerated the procedure well.    Plan  1. Provided education regarding water intake of at least 2 liters per day  2. F/u in 8 weeks with a renal ultrasound with CALLUM.  She will discuss if she wants the 7 mm right lower pole stone treated prophylactically.

## 2024-09-17 LAB
CALCIUM OXALATE DIHYDRATE MFR STONE IR: 10 %
COLOR STONE: NORMAL
COM MFR STONE: 90 %
COMPN STONE: NORMAL
LABORATORY COMMENT REPORT: NORMAL
Lab: NORMAL
Lab: NORMAL
PHOTO: NORMAL
SIZE STONE: NORMAL MM
SPEC SOURCE SUBJ: NORMAL
WT STONE: 9 MG

## 2024-09-18 DIAGNOSIS — B37.9 CANDIDIASIS: Primary | ICD-10-CM

## 2024-09-18 RX ORDER — NYSTATIN 100000 [USP'U]/G
POWDER TOPICAL 2 TIMES DAILY
Qty: 30 G | Refills: 1 | Status: SHIPPED | OUTPATIENT
Start: 2024-09-18

## 2024-11-05 ENCOUNTER — HOSPITAL ENCOUNTER (OUTPATIENT)
Dept: ULTRASOUND IMAGING | Facility: HOSPITAL | Age: 44
Discharge: HOME | End: 2024-11-05
Admitting: UROLOGY
Payer: COMMERCIAL

## 2024-11-05 DIAGNOSIS — N20.0 KIDNEY STONE: ICD-10-CM

## 2024-11-05 PROCEDURE — 76775 US EXAM ABDO BACK WALL LIM: CPT

## 2024-11-12 ENCOUNTER — OFFICE VISIT (OUTPATIENT)
Dept: UROLOGY | Facility: CLINIC | Age: 44
End: 2024-11-12
Payer: COMMERCIAL

## 2024-11-12 VITALS
BODY MASS INDEX: 38.25 KG/M2 | HEART RATE: 94 BPM | RESPIRATION RATE: 14 BRPM | OXYGEN SATURATION: 98 % | HEIGHT: 66 IN | TEMPERATURE: 97.4 F | WEIGHT: 238 LBS

## 2024-11-12 DIAGNOSIS — N20.0 KIDNEY STONE: Primary | ICD-10-CM

## 2024-11-12 NOTE — PROGRESS NOTES
Office Visit General Established Female Patient     Patient Name: Mary Kate Louise  : 1980   MRN: [id number]     Chief Complaint:   Chief Complaint   Patient presents with    kidney stone    Follow-up    Results       Referring Provider: No ref. provider found    History of Present Illness: Mary Kate Louise is a 44 y.o. female with history of nephrolithiasis s/p left URS LL with stent placement on .      Today she is here to review her renal ultrasound which shows no significant sonographic abnormality of the kidneys.  She reports that she has increased her water intake.  She is drinking lemon water and does not eat large amounts of processed or salted foods.  No new or worsening urologic complaints.      Subjective      Review of System: ROS reviewed by me and is negative unless otherwise noted in HPI.      Past Medical History:   Past Medical History:   Diagnosis Date    Kidney stone     Have a stent placed 24 and still have stone 7 mm     Past Surgical History:   Past Surgical History:   Procedure Laterality Date     SECTION  03    HYSTERECTOMY      KIDNEY STONE SURGERY  24    Still have stent and stone 7mm    LITHOTRIPSY  24    Dr. Frank aliceard stone    TONSILLECTOMY N/A     URETEROSCOPY LASER LITHOTRIPSY WITH STENT INSERTION Left 2024    Procedure: LEFT URETEROSCOPY LASER LITHOTRIPSY WITH STENT EXCHANGE;  Surgeon: Linus Marie MD;  Location: [facility];  Service: Urology;  Laterality: Left;     Family History: History reviewed. No pertinent family history.    Social History:   Social History     Socioeconomic History    Marital status:    Tobacco Use    Smoking status: Never     Passive exposure: Never    Smokeless tobacco: Never   Vaping Use    Vaping status: Never Used   Substance and Sexual Activity    Alcohol use: Never    Drug use: Never    Sexual activity: Yes     Partners: Male     Birth control/protection: Hysterectomy  "    Medications:   No current outpatient medications on file.    Allergies:   Allergies   Allergen Reactions    Amoxicillin Hives    Amoxicillin-Pot Clavulanate Hives    Levofloxacin Hives       Objective     Physical Exam:   Vital Signs:   Visit Vitals  Pulse 94   Temp 97.4 °F (36.3 °C) (Temporal)   Resp 14   Ht 167.6 cm (66\")   Wt 108 kg (238 lb)   SpO2 98%   BMI 38.41 kg/m²      Body mass index is 38.41 kg/m².   Physical Exam  Vitals and nursing note reviewed.   Constitutional:       General: She is not in acute distress.     Appearance: Normal appearance. She is overweight. She is not ill-appearing.   HENT:      Head: Normocephalic and atraumatic.   Pulmonary:      Effort: Pulmonary effort is normal.   Skin:     General: Skin is warm and dry.   Neurological:      General: No focal deficit present.      Mental Status: She is alert and oriented to person, place, and time.   Psychiatric:         Mood and Affect: Mood normal.         Behavior: Behavior normal.          Labs  Brief Urine Lab Results  (Last result in the past 365 days)        Color   Clarity   Blood   Leuk Est   Nitrite   Protein   CREAT   Urine HCG        09/04/24 1813 Yellow   Cloudy   Large (3+)   Small (1+)   Negative   >=300 mg/dL (3+)                   Lab Results   Component Value Date    GLUCOSE 96 09/05/2024    CALCIUM 8.5 (L) 09/05/2024     09/05/2024    K 4.1 09/05/2024    CO2 21.9 (L) 09/05/2024     09/05/2024    BUN 16 09/05/2024    CREATININE 0.81 09/05/2024    BCR 19.8 09/05/2024    ANIONGAP 10.1 09/05/2024       Lab Results   Component Value Date    WBC 7.99 09/05/2024    HGB 12.8 09/05/2024    HCT 38.5 09/05/2024    MCV 91.4 09/05/2024     09/05/2024       Urine Culture          9/4/2024    18:13   Urine Culture   Urine Culture No growth         Radiographic Studies  US Renal Bilateral    Result Date: 11/7/2024  Impression: No significant sonographic abnormality of the kidneys. Electronically Signed: Inna Ruiz, " MD  11/7/2024 12:15 PM EST  Workstation ID: [id number]    CT Abdomen Pelvis Without Contrast    Result Date: 9/5/2024  Interval placement of left ureteral stent with resolution of previously described hydronephrosis.  Bilateral nephrolithiasis as described.   CTDI: 11.8 mGy DLP:595.14 mGy.cm  This report was signed and finalized on 9/5/2024 11:06 AM by Tamiko Platt MD.      CT Abdomen Pelvis Without Contrast  Result Date: 8/24/2024  IMPRESSION: 1. Obstructive uropathy secondary to 7 mm calculus at the ureteral pelvic junction left kidney. 2. Right nephrolithiasis Electronically signed by Jo Villegas    I have reviewed the above labs and imaging.     Assessment / Plan      Assessment/Plan: Mary Kate Louise is a 44 y.o. female with history of nephrolithiasis s/p left URS LL with stent placement on September 5th.      Today she is here to review her renal ultrasound which shows no significant sonographic abnormality of the kidneys.  She reports that she has increased her water intake.  She is drinking lemon water and does not eat large amounts of processed or salted foods.  No new or worsening urologic complaints.     We discussed that her renal ultrasound is normal.  She is very pleased. I do not see the right renal stone on her ultrasound as previously imaged on CT scan.  I will have Ms. Louise follow up with me in 1 year with CT imaging.  She was encouraged to follow up earlier should the need arise. She was agreeable to this plan.  Questions/concerns addressed.       Diagnoses and all orders for this visit:    1. Kidney stone (Primary)  -     CT Abdomen Pelvis Stone Protocol; Future    Follow Up:   Return in about 1 year (around 11/12/2025) for Next scheduled follow up, with Ashanti with CT scan prior .    Ashanti Day, APRN, MSN, FNP-C  [facility]

## (undated) DEVICE — GLV SURG SENSICARE LT W/ALOE PF LF 7 STRL

## (undated) DEVICE — ST URO THERMEDX/FLUIDSMART IRR/FLD/WARM PNT/PRESS/300MMHG

## (undated) DEVICE — ADAPT/Y SCPE GATEWAY ADVNTGE

## (undated) DEVICE — NITINOL STONE RETRIEVAL BASKET: Brand: ZERO TIP

## (undated) DEVICE — GW AMPLTZ SUPRSTF PTFE JB .035 7X145CM

## (undated) DEVICE — NITINOL WIRE WITH HYDROPHILIC TIP: Brand: SENSOR

## (undated) DEVICE — URETERAL ACCESS SHEATH SET: Brand: NAVIGATOR HD

## (undated) DEVICE — DUAL LUMEN URETERAL CATHETER

## (undated) DEVICE — URETSCP FLX LITHOVUE/ELITE DIG REV/DEFLECT W/EMPOWER DISP

## (undated) DEVICE — SLV SCD CALF HEMOFORCE DVT THERP REPROC MD

## (undated) DEVICE — FRCP GRASP URNRY VERSAGRASP RTU 90DEG  61CM 1P/U DISP STRL

## (undated) DEVICE — SOL IRR NACL 0.9PCT 3000ML

## (undated) DEVICE — RETRIEVAL DEPLOYMENT DEVICE: Brand: LITHOVUE EMPOWER™

## (undated) DEVICE — RICH CYSTO: Brand: MEDLINE INDUSTRIES, INC.

## (undated) DEVICE — FIBR LASR MOSES 200 DFL 2J 80HZ